# Patient Record
Sex: MALE | Race: WHITE | NOT HISPANIC OR LATINO | Employment: FULL TIME | URBAN - NONMETROPOLITAN AREA
[De-identification: names, ages, dates, MRNs, and addresses within clinical notes are randomized per-mention and may not be internally consistent; named-entity substitution may affect disease eponyms.]

---

## 2017-02-13 ENCOUNTER — CLINICAL SUPPORT (OUTPATIENT)
Dept: CARDIOLOGY | Facility: CLINIC | Age: 57
End: 2017-02-13

## 2017-02-13 DIAGNOSIS — R55 SYNCOPE, UNSPECIFIED SYNCOPE TYPE: ICD-10-CM

## 2017-02-13 DIAGNOSIS — Z95.818 STATUS POST PLACEMENT OF IMPLANTABLE LOOP RECORDER: Primary | ICD-10-CM

## 2017-02-13 PROCEDURE — 93299 PR REM INTERROG ICPMS/SCRMS <30 D TECH REVIEW: CPT | Performed by: INTERNAL MEDICINE

## 2017-02-13 PROCEDURE — 93298 REM INTERROG DEV EVAL SCRMS: CPT | Performed by: INTERNAL MEDICINE

## 2017-02-13 NOTE — PROGRESS NOTES
Taviaq Report- [facility]    Primary Cardiologist:  Marce  Reason for implant:  syncope  Battery:  ok  Events:  No new events  Changes:  n/a    Follow up:  3 months

## 2017-02-18 ENCOUNTER — HOSPITAL ENCOUNTER (EMERGENCY)
Facility: HOSPITAL | Age: 57
Discharge: HOME | End: 2017-02-19
Attending: EMERGENCY MEDICINE | Admitting: EMERGENCY MEDICINE

## 2017-02-18 ENCOUNTER — APPOINTMENT (OUTPATIENT)
Dept: CT IMAGING | Facility: HOSPITAL | Age: 57
End: 2017-02-18

## 2017-02-18 ENCOUNTER — APPOINTMENT (OUTPATIENT)
Dept: GENERAL RADIOLOGY | Facility: HOSPITAL | Age: 57
End: 2017-02-18

## 2017-02-18 DIAGNOSIS — R55 SYNCOPE AND COLLAPSE: Primary | ICD-10-CM

## 2017-02-18 DIAGNOSIS — R79.89 POSITIVE D DIMER: ICD-10-CM

## 2017-02-18 LAB
ALBUMIN SERPL-MCNC: 4.2 G/DL (ref 3.5–5)
ALBUMIN/GLOB SERPL: 1.4 G/DL (ref 1.1–2.5)
ALP SERPL-CCNC: 89 U/L (ref 24–120)
ALT SERPL W P-5'-P-CCNC: 57 U/L (ref 0–54)
AMPHET+METHAMPHET UR QL: NEGATIVE
ANION GAP SERPL CALCULATED.3IONS-SCNC: 11 MMOL/L (ref 4–13)
ARTERIAL PATENCY WRIST A: NORMAL
AST SERPL-CCNC: 47 U/L (ref 7–45)
ATMOSPHERIC PRESS: NORMAL MMHG
BARBITURATES UR QL SCN: NEGATIVE
BASE EXCESS BLDA CALC-SCNC: -1.9 MMOL/L (ref -2–2)
BASOPHILS # BLD AUTO: 0.02 10*3/MM3 (ref 0–0.2)
BASOPHILS NFR BLD AUTO: 0.3 % (ref 0–2)
BDY SITE: NORMAL
BENZODIAZ UR QL SCN: NEGATIVE
BILIRUB SERPL-MCNC: 0.4 MG/DL (ref 0.1–1)
BUN BLD-MCNC: 14 MG/DL (ref 5–21)
BUN/CREAT SERPL: 13.6 (ref 7–25)
CALCIUM SPEC-SCNC: 8.6 MG/DL (ref 8.4–10.4)
CANNABINOIDS SERPL QL: NEGATIVE
CHLORIDE SERPL-SCNC: 104 MMOL/L (ref 98–110)
CO2 SERPL-SCNC: 25 MMOL/L (ref 24–31)
COCAINE UR QL: NEGATIVE
CREAT BLD-MCNC: 1.03 MG/DL (ref 0.5–1.4)
D DIMER PPP FEU-MCNC: 3.12 MG/L (FEU) (ref 0–0.5)
DEPRECATED RDW RBC AUTO: 42.6 FL (ref 40–54)
EOSINOPHIL # BLD AUTO: 0.02 10*3/MM3 (ref 0–0.7)
EOSINOPHIL NFR BLD AUTO: 0.3 % (ref 0–4)
ERYTHROCYTE [DISTWIDTH] IN BLOOD BY AUTOMATED COUNT: 13.8 % (ref 12–15)
ETHANOL UR QL: <0.01 %
GAS FLOW AIRWAY: 2 LPM
GFR SERPL CREATININE-BSD FRML MDRD: 75 ML/MIN/1.73
GLOBULIN UR ELPH-MCNC: 2.9 GM/DL
GLUCOSE BLD-MCNC: 110 MG/DL (ref 70–100)
HCO3 BLDA-SCNC: 23.1 MMOL/L (ref 22–26)
HCT VFR BLD AUTO: 43.2 % (ref 40–52)
HGB BLD-MCNC: 14.8 G/DL (ref 14–18)
IMM GRANULOCYTES # BLD: 0.03 10*3/MM3 (ref 0–0.03)
IMM GRANULOCYTES NFR BLD: 0.4 % (ref 0–5)
LYMPHOCYTES # BLD AUTO: 1.13 10*3/MM3 (ref 0.72–4.86)
LYMPHOCYTES NFR BLD AUTO: 15.8 % (ref 15–45)
MCH RBC QN AUTO: 29.1 PG (ref 28–32)
MCHC RBC AUTO-ENTMCNC: 34.3 G/DL (ref 33–36)
MCV RBC AUTO: 84.9 FL (ref 82–95)
METHADONE UR QL SCN: NEGATIVE
MODALITY: NORMAL
MONOCYTES # BLD AUTO: 0.67 10*3/MM3 (ref 0.19–1.3)
MONOCYTES NFR BLD AUTO: 9.4 % (ref 4–12)
NEUTROPHILS # BLD AUTO: 5.27 10*3/MM3 (ref 1.87–8.4)
NEUTROPHILS NFR BLD AUTO: 73.8 % (ref 39–78)
OPIATES UR QL: NEGATIVE
PCO2 BLDA: 40.2 MM HG (ref 35–45)
PCP UR QL SCN: NEGATIVE
PH BLDA: 7.38 PH UNITS (ref 7.35–7.45)
PLATELET # BLD AUTO: 204 10*3/MM3 (ref 130–400)
PMV BLD AUTO: 10.7 FL (ref 6–12)
PO2 BLDA: 89.1 MM HG (ref 80–100)
POTASSIUM BLD-SCNC: 4.2 MMOL/L (ref 3.5–5.3)
PROT SERPL-MCNC: 7.1 G/DL (ref 6.3–8.7)
RBC # BLD AUTO: 5.09 10*6/MM3 (ref 4.8–5.9)
SAO2 % BLDCOA: 96.7 % (ref 94–100)
SAO2 % BLDCOA: 96.7 % (ref 94–100)
SODIUM BLD-SCNC: 140 MMOL/L (ref 135–145)
TROPONIN I SERPL-MCNC: 0 NG/ML (ref 0–0.07)
WBC NRBC COR # BLD: 7.14 10*3/MM3 (ref 4.8–10.8)

## 2017-02-18 PROCEDURE — 80307 DRUG TEST PRSMV CHEM ANLYZR: CPT | Performed by: EMERGENCY MEDICINE

## 2017-02-18 PROCEDURE — 85025 COMPLETE CBC W/AUTO DIFF WBC: CPT | Performed by: EMERGENCY MEDICINE

## 2017-02-18 PROCEDURE — 99284 EMERGENCY DEPT VISIT MOD MDM: CPT

## 2017-02-18 PROCEDURE — 70450 CT HEAD/BRAIN W/O DYE: CPT

## 2017-02-18 PROCEDURE — 84484 ASSAY OF TROPONIN QUANT: CPT

## 2017-02-18 PROCEDURE — 93005 ELECTROCARDIOGRAM TRACING: CPT | Performed by: EMERGENCY MEDICINE

## 2017-02-18 PROCEDURE — 93010 ELECTROCARDIOGRAM REPORT: CPT | Performed by: INTERNAL MEDICINE

## 2017-02-18 PROCEDURE — 85379 FIBRIN DEGRADATION QUANT: CPT | Performed by: EMERGENCY MEDICINE

## 2017-02-18 PROCEDURE — 71010 HC CHEST PA OR AP: CPT

## 2017-02-18 PROCEDURE — 36600 WITHDRAWAL OF ARTERIAL BLOOD: CPT

## 2017-02-18 PROCEDURE — 80053 COMPREHEN METABOLIC PANEL: CPT | Performed by: EMERGENCY MEDICINE

## 2017-02-18 PROCEDURE — 82803 BLOOD GASES ANY COMBINATION: CPT

## 2017-02-18 RX ORDER — SODIUM CHLORIDE 0.9 % (FLUSH) 0.9 %
10 SYRINGE (ML) INJECTION AS NEEDED
Status: DISCONTINUED | OUTPATIENT
Start: 2017-02-18 | End: 2017-02-19 | Stop reason: HOSPADM

## 2017-02-19 VITALS
OXYGEN SATURATION: 97 % | BODY MASS INDEX: 23.1 KG/M2 | HEIGHT: 71 IN | WEIGHT: 165 LBS | HEART RATE: 87 BPM | RESPIRATION RATE: 16 BRPM | SYSTOLIC BLOOD PRESSURE: 135 MMHG | TEMPERATURE: 98.3 F | DIASTOLIC BLOOD PRESSURE: 84 MMHG

## 2017-02-19 NOTE — ED PROVIDER NOTES
Subjective   HPI Comments: Very vague historian     Patient is a 56 y.o. male presenting with syncope.   Syncope   Episode history:  Single  Most recent episode:  Today  Progression:  Resolved  Chronicity:  New  Context: standing up    Context: not blood draw, not bowel movement, not dehydration, not exertion, not inactivity, not medication change, not with normal activity, not sight of blood, not sitting down and not urination    Witnessed: yes    Relieved by:  Nothing  Worsened by:  Nothing  Ineffective treatments:  None tried  Associated symptoms: no anxiety, no chest pain, no confusion, no diaphoresis, no difficulty breathing, no dizziness, no fever, no focal weakness, no headaches, no malaise/fatigue, no nausea, no palpitations, no recent injury, no recent surgery, no rectal bleeding, no seizures, no shortness of breath, no visual change, no vomiting and no weakness    Risk factors: no congenital heart disease and no seizures        Review of Systems   Constitutional: Negative.  Negative for activity change, appetite change, chills, diaphoresis, fatigue, fever, malaise/fatigue and unexpected weight change.   HENT: Negative.  Negative for congestion, drooling, ear pain, facial swelling and trouble swallowing.    Eyes: Negative.  Negative for photophobia, discharge and itching.   Respiratory: Negative.  Negative for apnea, cough, choking, chest tightness, shortness of breath and stridor.    Cardiovascular: Positive for syncope. Negative for chest pain, palpitations and leg swelling.   Gastrointestinal: Negative.  Negative for abdominal distention, abdominal pain, diarrhea, nausea and vomiting.   Endocrine: Negative.  Negative for cold intolerance, heat intolerance, polydipsia and polyphagia.   Genitourinary: Negative.  Negative for decreased urine volume, frequency and hematuria.   Musculoskeletal: Negative.  Negative for arthralgias, back pain and neck pain.   Skin: Negative.  Negative for color change, pallor  and rash.   Allergic/Immunologic: Negative.    Neurological: Negative for dizziness, focal weakness, seizures, syncope, speech difficulty, weakness, light-headedness and headaches.   Hematological: Negative.    Psychiatric/Behavioral: Negative for confusion.   All other systems reviewed and are negative.      Past Medical History   Diagnosis Date   • Aortic valve stenosis    • Hyperlipidemia    • Hypertension    • Palpitations    • Syncope    • Tobacco use        No Known Allergies    Past Surgical History   Procedure Laterality Date   • Coronary artery bypass graft     • Cardiac valve replacement     • Atrial appendage exclusion left with transesophageal echocardiogram     • Aortic valve repair/replacement         Family History   Problem Relation Age of Onset   • Hypertension Mother    • Diabetes Mother    • Heart valve disorder Father    • Hypertension Father    • Heart disease Father        Social History     Social History   • Marital status:      Spouse name: N/A   • Number of children: N/A   • Years of education: N/A     Social History Main Topics   • Smoking status: Former Smoker   • Smokeless tobacco: Never Used   • Alcohol use 9.0 oz/week     15 Cans of beer per week   • Drug use: No   • Sexual activity: Not Asked     Other Topics Concern   • None     Social History Narrative   • None           Objective   Physical Exam   Constitutional: He is oriented to person, place, and time. He appears well-developed and well-nourished.  Non-toxic appearance. No distress.   HENT:   Head: Normocephalic and atraumatic.   Nose: Nose normal.   Mouth/Throat: Uvula is midline, oropharynx is clear and moist and mucous membranes are normal.   Eyes: Conjunctivae, EOM and lids are normal. Pupils are equal, round, and reactive to light. Lids are everted and swept, no foreign bodies found.   Neck: Trachea normal, full passive range of motion without pain and phonation normal. Neck supple. Normal carotid pulses and no JVD  present. Carotid bruit is not present. No rigidity.   Cardiovascular: Normal rate, regular rhythm, normal heart sounds, intact distal pulses and normal pulses.    Pulmonary/Chest: Effort normal and breath sounds normal. No stridor. No apnea and no tachypnea. No respiratory distress.   Abdominal: Soft. Normal appearance, normal aorta and bowel sounds are normal. There is no hepatosplenomegaly. No hernia.       Vascular Status -  His exam exhibits right foot vasculature normal. His exam exhibits no right foot edema. His exam exhibits left foot vasculature normal. His exam exhibits no left foot edema.  Neurological: He is alert and oriented to person, place, and time. He has normal strength and normal reflexes. He displays normal reflexes. No cranial nerve deficit or sensory deficit. Gait normal. GCS eye subscore is 4. GCS verbal subscore is 5. GCS motor subscore is 6.   Skin: Skin is warm, dry and intact. He is not diaphoretic. No pallor.   Psychiatric: He has a normal mood and affect. His speech is normal and behavior is normal.   Nursing note and vitals reviewed.      Procedures         ED Course  ED Course   Comment By Time   Sinus rhythm with nonspecific ST-T wave changes and is as needed as he felt he that he sometimes feels he has regarding no nocturia GW.  So I will give her another one this is where he has none McGray O he is doing that and the to go upstairs to the bathroom with the he water, overlying the floor and he will Irving Martinez MD 02/18 2156   CT is negative by the v rad Irving Martinez MD 02/18 7253   I have discussed this case at length with the patient and he had a syncopal episode at workstrongly encouraged him to be admitted to the hospital for further workupinitiated get MRIs and CT of the chest since his d-dimer is elevatedhe states that he has all these tests done in the past he has that MRIs he is got a loop recorderhe has had 3 separate similar episode in the past very comprehensive workup  with negative findingshe does not want to stay in the hospital for further workup to his prior charts and he did have elevated d-dimer in the past which was followed up by a negative CT of the chest he has had MRIs in the past Irving Martinez MD 02/19 0057   I still think the patient is to be admitted since he had a syncopal episode but after discussion decided go home will be discharged home with close follow-up with her primary M.D. Irving Martinez MD 02/19 0058                  [facility]    Final diagnoses:   Syncope and collapse   Positive D dimer            Irving Martinez MD  02/19/17 0100

## 2017-02-19 NOTE — ED NOTES
Pt states no S/S before syncopal episode occurred. Pt states he does not remember what happened, pt remembers waking up in the ambulance.  Pt is A&O x 4.     Bernice Stockton RN  02/18/17 0655

## 2017-04-04 ENCOUNTER — CLINICAL SUPPORT (OUTPATIENT)
Dept: CARDIOLOGY | Facility: CLINIC | Age: 57
End: 2017-04-04

## 2017-04-04 ENCOUNTER — OFFICE VISIT (OUTPATIENT)
Dept: CARDIOLOGY | Facility: CLINIC | Age: 57
End: 2017-04-04

## 2017-04-04 VITALS
BODY MASS INDEX: 25.48 KG/M2 | HEART RATE: 74 BPM | WEIGHT: 182 LBS | HEIGHT: 71 IN | SYSTOLIC BLOOD PRESSURE: 142 MMHG | DIASTOLIC BLOOD PRESSURE: 86 MMHG

## 2017-04-04 DIAGNOSIS — R55 SYNCOPE, UNSPECIFIED SYNCOPE TYPE: ICD-10-CM

## 2017-04-04 DIAGNOSIS — R55 SYNCOPE, UNSPECIFIED SYNCOPE TYPE: Primary | ICD-10-CM

## 2017-04-04 DIAGNOSIS — Z95.818 STATUS POST PLACEMENT OF IMPLANTABLE LOOP RECORDER: Primary | ICD-10-CM

## 2017-04-04 DIAGNOSIS — Z95.2 S/P AVR (AORTIC VALVE REPLACEMENT): ICD-10-CM

## 2017-04-04 DIAGNOSIS — I10 ESSENTIAL HYPERTENSION: ICD-10-CM

## 2017-04-04 PROCEDURE — 93000 ELECTROCARDIOGRAM COMPLETE: CPT | Performed by: NURSE PRACTITIONER

## 2017-04-04 PROCEDURE — 93285 PRGRMG DEV EVAL SCRMS IP: CPT | Performed by: INTERNAL MEDICINE

## 2017-04-04 PROCEDURE — 99214 OFFICE O/P EST MOD 30 MIN: CPT | Performed by: NURSE PRACTITIONER

## 2017-04-04 NOTE — PROGRESS NOTES
"    Subjective:     Encounter Date:04/04/2017      Patient ID: Jose Juan Ann is a 56 y.o. male.    Chief Complaint:  HPI Comments: Pt reports he continues to have syncope. These episodes have been intermittent since 2015. His aortic valve was replaced 7/2016. He has continued to have syncopal episodes - again in Feb. And last week. He was evaluated in the ER in Feb.- he had an elevated d-dimer at that time but refused a CTA chest.  He states this happens when he is sitting back and his legs are elevated. He reports his co workers report he lets out a moan and he is out for approximately 30 seconds and he \"turns blue\" and stiffens, but does not convulse. He states he has had a complete workup by Dr. Rodriguez as well.       The following portions of the patient's history were reviewed and updated as appropriate: allergies, current medications, past family history, past medical history, past social history, past surgical history and problem list.  /86  Pulse 74  Ht 71\" (180.3 cm)  Wt 182 lb (82.6 kg)  BMI 25.38 kg/m2  No Known Allergies    Current Outpatient Prescriptions:   •  aspirin 81 MG EC tablet, Take 1 tablet by mouth Daily., Disp: 30 tablet, Rfl: 5  •  Krill Oil 300 MG capsule, Take 300 mg by mouth Daily., Disp: , Rfl:   •  lisinopril (PRINIVIL,ZESTRIL) 2.5 MG tablet, Take 1 tablet by mouth Daily., Disp: 30 tablet, Rfl: 5  •  metoprolol tartrate (LOPRESSOR) 25 MG tablet, Take 1/2 tablet (12.5mg) PO BID, Disp: 30 tablet, Rfl: 5  •  Multiple Vitamins-Minerals (MULTIVITAMIN ADULT PO), Take  by mouth., Disp: , Rfl:   Past Medical History:   Diagnosis Date   • Aortic valve stenosis    • History of loop recorder    • Hyperlipidemia    • Hypertension    • Palpitations    • Syncope    • Tobacco use      Past Surgical History:   Procedure Laterality Date   • AORTIC VALVE REPAIR/REPLACEMENT     • ATRIAL APPENDAGE EXCLUSION LEFT WITH TRANSESOPHAGEAL ECHOCARDIOGRAM     • CARDIAC VALVE REPLACEMENT     • CORONARY " ARTERY BYPASS GRAFT       Social History     Social History   • Marital status:      Spouse name: N/A   • Number of children: N/A   • Years of education: N/A     Occupational History   • Not on file.     Social History Main Topics   • Smoking status: Former Smoker   • Smokeless tobacco: Never Used   • Alcohol use 9.0 oz/week     15 Cans of beer per week      Comment: 3-5 daily   • Drug use: No   • Sexual activity: Defer     Other Topics Concern   • Not on file     Social History Narrative     Family History   Problem Relation Age of Onset   • Hypertension Mother    • Diabetes Mother    • Heart valve disorder Father    • Hypertension Father    • Heart disease Father        Review of Systems   Constitution: Negative for chills, diaphoresis, fever and weakness.   HENT: Negative for nosebleeds.    Eyes: Negative for visual disturbance.   Cardiovascular: Positive for syncope. Negative for chest pain, claudication, cyanosis, dyspnea on exertion, irregular heartbeat, leg swelling, near-syncope, orthopnea, palpitations and paroxysmal nocturnal dyspnea.   Respiratory: Negative for cough, hemoptysis, shortness of breath, sputum production and wheezing.    Hematologic/Lymphatic: Negative for bleeding problem.   Skin: Negative for color change and flushing.   Musculoskeletal: Negative for falls.   Gastrointestinal: Negative for bloating, abdominal pain, hematemesis, hematochezia, melena, nausea and vomiting.   Genitourinary: Negative for hematuria.   Neurological: Negative for dizziness and light-headedness.   Psychiatric/Behavioral: Negative for altered mental status.         ECG 12 Lead  Date/Time: 4/4/2017 4:27 PM  Performed by: HARPAL CORONADO  Authorized by: HARPAL CORONADO   Comparison: compared with previous ECG from 2/18/2017  Similar to previous ECG  Rhythm: sinus rhythm               Objective:     Physical Exam   Constitutional: He is oriented to person, place, and time. He appears well-developed and well-nourished.  No distress.   HENT:   Head: Normocephalic and atraumatic.   Eyes: Pupils are equal, round, and reactive to light.   Neck: Normal range of motion. Neck supple. No JVD present. No thyromegaly present.   Cardiovascular: Normal rate, regular rhythm, normal heart sounds and intact distal pulses.  Exam reveals no gallop and no friction rub.    No murmur heard.  Pulses:       Dorsalis pedis pulses are 2+ on the right side, and 2+ on the left side.   Pulmonary/Chest: Effort normal and breath sounds normal. No respiratory distress. He has no wheezes. He has no rales. He exhibits no tenderness.   Abdominal: Soft. Bowel sounds are normal. He exhibits no distension. There is no tenderness.   Musculoskeletal: Normal range of motion. He exhibits no edema.   Neurological: He is alert and oriented to person, place, and time. No cranial nerve deficit.   Skin: Skin is warm and dry. He is not diaphoretic.   Psychiatric: He has a normal mood and affect. His behavior is normal.       Lab Review:       Assessment:          Diagnosis Plan   1. Syncope, unspecified syncope type  Adult Transthoracic Echo Complete With Contrast   2. S/P AVR (aortic valve replacement)  Bioprosthetic, and ALEJANDRA exclusion 7/2016 per Dr. Aponte   3. Essential hypertension  Borderline elevated today, pt reports low pressures at times at home, as well as high. Call here or PCP with persistently high or low pressures      Loop recorder interrogated by Myra Ochoa RN. Reviewed results with her, no significant arrhythmia.        Plan:       Follow up 1 month, sooner with new or worsening symptoms.

## 2017-04-05 ENCOUNTER — TELEPHONE (OUTPATIENT)
Dept: CARDIOLOGY | Facility: CLINIC | Age: 57
End: 2017-04-05

## 2017-04-05 DIAGNOSIS — R55 SYNCOPE, UNSPECIFIED SYNCOPE TYPE: Primary | ICD-10-CM

## 2017-04-05 NOTE — TELEPHONE ENCOUNTER
----- Message from TRAV Mcgovern sent at 4/4/2017  4:40 PM CDT -----  Please notify pt, due to his recent D-dimer elevation in the ER, I recommend CTA of the chest (because of his recent syncope as well). Dr. Martinez's note states he refused a CTA of the chest in the ER that day. Would he be willing to reconsider this to rule out PE or other abnormality ?

## 2017-04-05 NOTE — PROGRESS NOTES
Jay Report- in office    April 4, 2017     Primary Cardiologist:  Marce  Reason for implant:  syncope  Battery:  good  Events:  1 symptom recorded- appears to be normal sinus  bpm  Changes:  Changed blank after sense to 130ms, changed sensing threshold decay delay to 130ms both due to seeing some undersensing of PVCs in previous episodes.    Follow up:  3 months or sooner if needed.

## 2017-04-05 NOTE — TELEPHONE ENCOUNTER
Pt has been notified. He finally said he would have a CTA done but he was not wanting to because he said in the last couple of years his D dimer has been elevated and he has them done and it is always normal.Before getting off the phone he said he would do it.

## 2017-04-11 ENCOUNTER — HOSPITAL ENCOUNTER (OUTPATIENT)
Dept: CARDIOLOGY | Facility: HOSPITAL | Age: 57
Discharge: HOME | End: 2017-04-11
Admitting: NURSE PRACTITIONER

## 2017-04-11 ENCOUNTER — HOSPITAL ENCOUNTER (OUTPATIENT)
Dept: CT IMAGING | Facility: HOSPITAL | Age: 57
Discharge: HOME | End: 2017-04-11

## 2017-04-11 VITALS
SYSTOLIC BLOOD PRESSURE: 130 MMHG | DIASTOLIC BLOOD PRESSURE: 85 MMHG | HEIGHT: 71 IN | BODY MASS INDEX: 25.2 KG/M2 | WEIGHT: 180 LBS

## 2017-04-11 DIAGNOSIS — R55 SYNCOPE, UNSPECIFIED SYNCOPE TYPE: ICD-10-CM

## 2017-04-11 LAB
BH CV ECHO MEAS - AO MAX PG (FULL): 13.8 MMHG
BH CV ECHO MEAS - AO MAX PG: 17.8 MMHG
BH CV ECHO MEAS - AO MEAN PG (FULL): 6 MMHG
BH CV ECHO MEAS - AO MEAN PG: 8 MMHG
BH CV ECHO MEAS - AO ROOT AREA: 11.3 CM^2
BH CV ECHO MEAS - AO ROOT DIAM: 3.8 CM
BH CV ECHO MEAS - AO V2 MAX: 211 CM/SEC
BH CV ECHO MEAS - AO V2 MEAN: 131 CM/SEC
BH CV ECHO MEAS - AO V2 VTI: 46 CM
BH CV ECHO MEAS - AVA(I,A): 1.5 CM^2
BH CV ECHO MEAS - AVA(I,D): 1.5 CM^2
BH CV ECHO MEAS - AVA(V,A): 1.5 CM^2
BH CV ECHO MEAS - AVA(V,D): 1.5 CM^2
BH CV ECHO MEAS - CONTRAST EF 4CH: 66.2 ML/M^2
BH CV ECHO MEAS - EDV(CUBED): 100.5 ML
BH CV ECHO MEAS - EDV(MOD-SP4): 71.7 ML
BH CV ECHO MEAS - EDV(TEICH): 99.8 ML
BH CV ECHO MEAS - EF(CUBED): 68.9 %
BH CV ECHO MEAS - EF(MOD-SP4): 66.2 %
BH CV ECHO MEAS - EF(TEICH): 60.5 %
BH CV ECHO MEAS - ESV(CUBED): 31.3 ML
BH CV ECHO MEAS - ESV(MOD-SP4): 24.2 ML
BH CV ECHO MEAS - ESV(TEICH): 39.4 ML
BH CV ECHO MEAS - FS: 32.3 %
BH CV ECHO MEAS - IVS/LVPW: 0.93
BH CV ECHO MEAS - IVSD: 0.86 CM
BH CV ECHO MEAS - LA DIMENSION: 3.9 CM
BH CV ECHO MEAS - LA/AO: 1
BH CV ECHO MEAS - LAT PEAK E' VEL: 7.4 CM/SEC
BH CV ECHO MEAS - LV MASS(C)D: 137.5 GRAMS
BH CV ECHO MEAS - LV MAX PG: 4 MMHG
BH CV ECHO MEAS - LV MEAN PG: 2 MMHG
BH CV ECHO MEAS - LV V1 MAX: 100 CM/SEC
BH CV ECHO MEAS - LV V1 MEAN: 54.9 CM/SEC
BH CV ECHO MEAS - LV V1 VTI: 22.2 CM
BH CV ECHO MEAS - LVIDD: 4.7 CM
BH CV ECHO MEAS - LVIDS: 3.2 CM
BH CV ECHO MEAS - LVLD AP4: 8.4 CM
BH CV ECHO MEAS - LVLS AP4: 6.7 CM
BH CV ECHO MEAS - LVOT AREA (M): 3.1 CM^2
BH CV ECHO MEAS - LVOT AREA: 3.1 CM^2
BH CV ECHO MEAS - LVOT DIAM: 2 CM
BH CV ECHO MEAS - LVPWD: 0.92 CM
BH CV ECHO MEAS - MV A MAX VEL: 62.1 CM/SEC
BH CV ECHO MEAS - MV DEC TIME: 0.19 SEC
BH CV ECHO MEAS - MV E MAX VEL: 70.3 CM/SEC
BH CV ECHO MEAS - MV E/A: 1.1
BH CV ECHO MEAS - SV(AO): 521.7 ML
BH CV ECHO MEAS - SV(CUBED): 69.3 ML
BH CV ECHO MEAS - SV(LVOT): 69.7 ML
BH CV ECHO MEAS - SV(MOD-SP4): 47.5 ML
BH CV ECHO MEAS - SV(TEICH): 60.4 ML
CREAT BLDA-MCNC: 1.1 MG/DL (ref 0.6–1.3)
E/E' RATIO: 10.4
LEFT ATRIUM VOLUME: 60 CM3

## 2017-04-11 PROCEDURE — 93306 TTE W/DOPPLER COMPLETE: CPT | Performed by: INTERNAL MEDICINE

## 2017-04-11 PROCEDURE — 93306 TTE W/DOPPLER COMPLETE: CPT

## 2017-04-11 PROCEDURE — 82565 ASSAY OF CREATININE: CPT

## 2017-04-11 PROCEDURE — 71275 CT ANGIOGRAPHY CHEST: CPT

## 2017-04-11 PROCEDURE — 0 IOPAMIDOL PER 1 ML: Performed by: NURSE PRACTITIONER

## 2017-04-11 RX ADMIN — IOPAMIDOL 100 ML: 755 INJECTION, SOLUTION INTRAVENOUS at 10:30

## 2017-04-12 ENCOUNTER — TELEPHONE (OUTPATIENT)
Dept: CARDIOLOGY | Facility: CLINIC | Age: 57
End: 2017-04-12

## 2017-04-12 NOTE — TELEPHONE ENCOUNTER
----- Message from TRAV Mcgovern sent at 4/11/2017  2:09 PM CDT -----  Please notify pt echo is WNL- normal pumping function and normal function of the aortic valve. Thanks.   ----- Message -----     From: Mani Zheng MD     Sent: 4/11/2017   1:47 PM       To: TRAV Mcgovern

## 2017-04-12 NOTE — TELEPHONE ENCOUNTER
----- Message from TRAV Mcgovern sent at 4/11/2017 10:29 AM CDT -----  Please notify pt CTA looks okay; no acute process or findings. Thanks.   ----- Message -----     From: Interface, Rad Results [place] In     Sent: 4/11/2017  10:24 AM       To: TRAV Mcgovern

## 2017-05-22 ENCOUNTER — TELEPHONE (OUTPATIENT)
Dept: CARDIOLOGY | Facility: CLINIC | Age: 57
End: 2017-05-22

## 2017-07-06 ENCOUNTER — CLINICAL SUPPORT (OUTPATIENT)
Dept: CARDIOLOGY | Facility: CLINIC | Age: 57
End: 2017-07-06

## 2017-07-06 DIAGNOSIS — Z95.818 STATUS POST PLACEMENT OF IMPLANTABLE LOOP RECORDER: Primary | ICD-10-CM

## 2017-07-06 DIAGNOSIS — R55 SYNCOPE, UNSPECIFIED SYNCOPE TYPE: ICD-10-CM

## 2017-07-06 PROCEDURE — 93299 PR REM INTERROG ICPMS/SCRMS <30 D TECH REVIEW: CPT | Performed by: INTERNAL MEDICINE

## 2017-07-06 PROCEDURE — 93298 REM INTERROG DEV EVAL SCRMS: CPT | Performed by: INTERNAL MEDICINE

## 2017-07-06 NOTE — PROGRESS NOTES
[facility]q Report- [facility]    July 6, 2017    Primary Cardiologist:  Marce  Reason for implant:  syncope  Battery:  OK  Events:  No new events  Changes:  n/a    Follow up:  3 months

## 2017-10-06 ENCOUNTER — CLINICAL SUPPORT (OUTPATIENT)
Dept: CARDIOLOGY | Facility: CLINIC | Age: 57
End: 2017-10-06

## 2017-10-06 DIAGNOSIS — R55 SYNCOPE, UNSPECIFIED SYNCOPE TYPE: ICD-10-CM

## 2017-10-06 PROCEDURE — 93298 REM INTERROG DEV EVAL SCRMS: CPT | Performed by: PHYSICIAN ASSISTANT

## 2017-10-06 PROCEDURE — 93299 PR REM INTERROG ICPMS/SCRMS <30 D TECH REVIEW: CPT | Performed by: PHYSICIAN ASSISTANT

## 2017-10-11 NOTE — PROGRESS NOTES
Linq Report- [facility]    October 10, 2017    Primary Cardiologist:  Marce  Reason for implant:  syncope  Battery:  ok  Events:  No new events  Changes:  n/a    Follow up:  3 months

## 2019-04-05 ENCOUNTER — HOSPITAL ENCOUNTER (OUTPATIENT)
Facility: HOSPITAL | Age: 59
Discharge: HOME | End: 2019-04-05
Attending: EMERGENCY MEDICINE | Admitting: EMERGENCY MEDICINE

## 2019-04-05 ENCOUNTER — APPOINTMENT (OUTPATIENT)
Dept: NEUROLOGY | Facility: HOSPITAL | Age: 59
End: 2019-04-05

## 2019-04-05 ENCOUNTER — APPOINTMENT (OUTPATIENT)
Dept: GENERAL RADIOLOGY | Facility: HOSPITAL | Age: 59
End: 2019-04-05

## 2019-04-05 ENCOUNTER — ANESTHESIA (OUTPATIENT)
Dept: PERIOP | Facility: HOSPITAL | Age: 59
End: 2019-04-05

## 2019-04-05 ENCOUNTER — APPOINTMENT (OUTPATIENT)
Dept: CARDIOLOGY | Facility: HOSPITAL | Age: 59
End: 2019-04-05

## 2019-04-05 ENCOUNTER — ANESTHESIA EVENT (OUTPATIENT)
Dept: PERIOP | Facility: HOSPITAL | Age: 59
End: 2019-04-05

## 2019-04-05 VITALS
HEART RATE: 88 BPM | BODY MASS INDEX: 25.49 KG/M2 | HEIGHT: 71 IN | RESPIRATION RATE: 16 BRPM | DIASTOLIC BLOOD PRESSURE: 89 MMHG | TEMPERATURE: 97.9 F | WEIGHT: 182.1 LBS | SYSTOLIC BLOOD PRESSURE: 134 MMHG | OXYGEN SATURATION: 99 %

## 2019-04-05 DIAGNOSIS — R55 SYNCOPE, UNSPECIFIED SYNCOPE TYPE: ICD-10-CM

## 2019-04-05 DIAGNOSIS — S01.81XA FACIAL LACERATION, INITIAL ENCOUNTER: Primary | ICD-10-CM

## 2019-04-05 DIAGNOSIS — S02.92XB OPEN FRACTURE OF FACIAL BONE, UNSPECIFIED FACIAL BONE, INITIAL ENCOUNTER (HCC): ICD-10-CM

## 2019-04-05 DIAGNOSIS — S02.40DB OPEN FRACTURE OF LEFT SIDE OF MAXILLA, INITIAL ENCOUNTER (HCC): ICD-10-CM

## 2019-04-05 LAB
ABO GROUP BLD: NORMAL
ANION GAP SERPL CALCULATED.3IONS-SCNC: 8 MMOL/L (ref 4–13)
APTT PPP: 31.5 SECONDS (ref 24.1–35)
BASOPHILS # BLD AUTO: 0.03 10*3/MM3 (ref 0–0.2)
BASOPHILS NFR BLD AUTO: 0.2 % (ref 0–2)
BH CV ECHO MEAS - AO MAX PG (FULL): 43.8 MMHG
BH CV ECHO MEAS - AO MAX PG: 49.3 MMHG
BH CV ECHO MEAS - AO MEAN PG (FULL): 25.5 MMHG
BH CV ECHO MEAS - AO MEAN PG: 28.5 MMHG
BH CV ECHO MEAS - AO ROOT AREA (BSA CORRECTED): 1.4
BH CV ECHO MEAS - AO ROOT AREA: 6.6 CM^2
BH CV ECHO MEAS - AO ROOT DIAM: 2.9 CM
BH CV ECHO MEAS - AO V2 MAX: 351 CM/SEC
BH CV ECHO MEAS - AO V2 MEAN: 246 CM/SEC
BH CV ECHO MEAS - AO V2 VTI: 79.8 CM
BH CV ECHO MEAS - AVA(I,A): 1.5 CM^2
BH CV ECHO MEAS - AVA(I,D): 1.5 CM^2
BH CV ECHO MEAS - AVA(V,A): 1.3 CM^2
BH CV ECHO MEAS - AVA(V,D): 1.3 CM^2
BH CV ECHO MEAS - BSA(HAYCOCK): 2 M^2
BH CV ECHO MEAS - BSA: 2 M^2
BH CV ECHO MEAS - BZI_BMI: 25.4 KILOGRAMS/M^2
BH CV ECHO MEAS - BZI_METRIC_HEIGHT: 180.3 CM
BH CV ECHO MEAS - BZI_METRIC_WEIGHT: 82.6 KG
BH CV ECHO MEAS - EDV(CUBED): 85.8 ML
BH CV ECHO MEAS - EDV(MOD-SP4): 67.2 ML
BH CV ECHO MEAS - EDV(TEICH): 88.2 ML
BH CV ECHO MEAS - EF(CUBED): 78.1 %
BH CV ECHO MEAS - EF(MOD-SP4): 62.5 %
BH CV ECHO MEAS - EF(TEICH): 70.5 %
BH CV ECHO MEAS - ESV(CUBED): 18.8 ML
BH CV ECHO MEAS - ESV(MOD-SP4): 25.2 ML
BH CV ECHO MEAS - ESV(TEICH): 26 ML
BH CV ECHO MEAS - FS: 39.7 %
BH CV ECHO MEAS - IVS/LVPW: 0.89
BH CV ECHO MEAS - IVSD: 1.2 CM
BH CV ECHO MEAS - LA DIMENSION: 4.3 CM
BH CV ECHO MEAS - LA/AO: 1.5
BH CV ECHO MEAS - LAT PEAK E' VEL: 13.3 CM/SEC
BH CV ECHO MEAS - LV DIASTOLIC VOL/BSA (35-75): 33.2 ML/M^2
BH CV ECHO MEAS - LV MASS(C)D: 199 GRAMS
BH CV ECHO MEAS - LV MASS(C)DI: 98.2 GRAMS/M^2
BH CV ECHO MEAS - LV MAX PG: 5.5 MMHG
BH CV ECHO MEAS - LV MEAN PG: 3 MMHG
BH CV ECHO MEAS - LV SYSTOLIC VOL/BSA (12-30): 12.4 ML/M^2
BH CV ECHO MEAS - LV V1 MAX: 117 CM/SEC
BH CV ECHO MEAS - LV V1 MEAN: 83.5 CM/SEC
BH CV ECHO MEAS - LV V1 VTI: 30.6 CM
BH CV ECHO MEAS - LVIDD: 4.4 CM
BH CV ECHO MEAS - LVIDS: 2.7 CM
BH CV ECHO MEAS - LVLD AP4: 8.5 CM
BH CV ECHO MEAS - LVLS AP4: 6.5 CM
BH CV ECHO MEAS - LVOT AREA (M): 3.8 CM^2
BH CV ECHO MEAS - LVOT AREA: 3.8 CM^2
BH CV ECHO MEAS - LVOT DIAM: 2.2 CM
BH CV ECHO MEAS - LVPWD: 1.3 CM
BH CV ECHO MEAS - MED PEAK E' VEL: 8.8 CM/SEC
BH CV ECHO MEAS - MR ALIAS VEL: 30.8 CM/SEC
BH CV ECHO MEAS - MR ERO: 0.12 CM^2
BH CV ECHO MEAS - MR FLOW RATE: 69.7 CM^3/SEC
BH CV ECHO MEAS - MR MAX PG: 136.4 MMHG
BH CV ECHO MEAS - MR MAX VEL: 584 CM/SEC
BH CV ECHO MEAS - MR MEAN PG: 103 MMHG
BH CV ECHO MEAS - MR MEAN VEL: 481 CM/SEC
BH CV ECHO MEAS - MR PISA RADIUS: 0.6 CM
BH CV ECHO MEAS - MR PISA: 2.3 CM^2
BH CV ECHO MEAS - MR VOLUME: 16.7 ML
BH CV ECHO MEAS - MR VTI: 140 CM
BH CV ECHO MEAS - MV A MAX VEL: 69.8 CM/SEC
BH CV ECHO MEAS - MV DEC TIME: 0.32 SEC
BH CV ECHO MEAS - MV E MAX VEL: 95.1 CM/SEC
BH CV ECHO MEAS - MV E/A: 1.4
BH CV ECHO MEAS - RAP SYSTOLE: 10 MMHG
BH CV ECHO MEAS - RVSP: 33.8 MMHG
BH CV ECHO MEAS - SI(AO): 260.2 ML/M^2
BH CV ECHO MEAS - SI(CUBED): 33 ML/M^2
BH CV ECHO MEAS - SI(LVOT): 57.4 ML/M^2
BH CV ECHO MEAS - SI(MOD-SP4): 20.7 ML/M^2
BH CV ECHO MEAS - SI(TEICH): 30.7 ML/M^2
BH CV ECHO MEAS - SV(AO): 527.1 ML
BH CV ECHO MEAS - SV(CUBED): 66.9 ML
BH CV ECHO MEAS - SV(LVOT): 116.3 ML
BH CV ECHO MEAS - SV(MOD-SP4): 42 ML
BH CV ECHO MEAS - SV(TEICH): 62.1 ML
BH CV ECHO MEAS - TR MAX VEL: 244 CM/SEC
BH CV ECHO MEASUREMENTS AVERAGE E/E' RATIO: 8.61
BLD GP AB SCN SERPL QL: NEGATIVE
BUN BLD-MCNC: 21 MG/DL (ref 5–21)
BUN/CREAT SERPL: 28.4 (ref 7–25)
CALCIUM SPEC-SCNC: 8.9 MG/DL (ref 8.4–10.4)
CHLORIDE SERPL-SCNC: 103 MMOL/L (ref 98–110)
CO2 SERPL-SCNC: 28 MMOL/L (ref 24–31)
CREAT BLD-MCNC: 0.74 MG/DL (ref 0.5–1.4)
DEPRECATED RDW RBC AUTO: 43.6 FL (ref 40–54)
EOSINOPHIL # BLD AUTO: 0 10*3/MM3 (ref 0–0.7)
EOSINOPHIL NFR BLD AUTO: 0 % (ref 0–4)
ERYTHROCYTE [DISTWIDTH] IN BLOOD BY AUTOMATED COUNT: 14.1 % (ref 12–15)
GFR SERPL CREATININE-BSD FRML MDRD: 109 ML/MIN/1.73
GLUCOSE BLD-MCNC: 96 MG/DL (ref 70–100)
HCT VFR BLD AUTO: 39.5 % (ref 40–52)
HGB BLD-MCNC: 13.4 G/DL (ref 14–18)
IMM GRANULOCYTES # BLD AUTO: 0.06 10*3/MM3 (ref 0–0.05)
IMM GRANULOCYTES NFR BLD AUTO: 0.4 % (ref 0–5)
INR PPP: 0.94 (ref 0.91–1.09)
LEFT ATRIUM VOLUME INDEX: 15.5 ML/M2
LEFT ATRIUM VOLUME: 31.5 CM3
LYMPHOCYTES # BLD AUTO: 0.91 10*3/MM3 (ref 0.72–4.86)
LYMPHOCYTES NFR BLD AUTO: 6.4 % (ref 15–45)
MAXIMAL PREDICTED HEART RATE: 162 BPM
MCH RBC QN AUTO: 28.9 PG (ref 28–32)
MCHC RBC AUTO-ENTMCNC: 33.9 G/DL (ref 33–36)
MCV RBC AUTO: 85.3 FL (ref 82–95)
MONOCYTES # BLD AUTO: 0.89 10*3/MM3 (ref 0.19–1.3)
MONOCYTES NFR BLD AUTO: 6.3 % (ref 4–12)
NEUTROPHILS # BLD AUTO: 12.32 10*3/MM3 (ref 1.87–8.4)
NEUTROPHILS NFR BLD AUTO: 86.7 % (ref 39–78)
NRBC BLD AUTO-RTO: 0 /100 WBC (ref 0–0)
PLATELET # BLD AUTO: 219 10*3/MM3 (ref 130–400)
PMV BLD AUTO: 10.4 FL (ref 6–12)
POTASSIUM BLD-SCNC: 4.3 MMOL/L (ref 3.5–5.3)
PROTHROMBIN TIME: 12.9 SECONDS (ref 11.9–14.6)
RBC # BLD AUTO: 4.63 10*6/MM3 (ref 4.8–5.9)
RH BLD: POSITIVE
SODIUM BLD-SCNC: 139 MMOL/L (ref 135–145)
STRESS TARGET HR: 138 BPM
T&S EXPIRATION DATE: NORMAL
WBC NRBC COR # BLD: 14.21 10*3/MM3 (ref 4.8–10.8)

## 2019-04-05 PROCEDURE — 90715 TDAP VACCINE 7 YRS/> IM: CPT | Performed by: EMERGENCY MEDICINE

## 2019-04-05 PROCEDURE — 93306 TTE W/DOPPLER COMPLETE: CPT | Performed by: INTERNAL MEDICINE

## 2019-04-05 PROCEDURE — 13152 CMPLX RPR E/N/E/L 2.6-7.5 CM: CPT | Performed by: OTOLARYNGOLOGY

## 2019-04-05 PROCEDURE — 31231 NASAL ENDOSCOPY DX: CPT | Performed by: OTOLARYNGOLOGY

## 2019-04-05 PROCEDURE — 25010000002 SUCCINYLCHOLINE PER 20 MG: Performed by: NURSE ANESTHETIST, CERTIFIED REGISTERED

## 2019-04-05 PROCEDURE — 86850 RBC ANTIBODY SCREEN: CPT | Performed by: EMERGENCY MEDICINE

## 2019-04-05 PROCEDURE — 93010 ELECTROCARDIOGRAM REPORT: CPT | Performed by: INTERNAL MEDICINE

## 2019-04-05 PROCEDURE — 90471 IMMUNIZATION ADMIN: CPT | Performed by: EMERGENCY MEDICINE

## 2019-04-05 PROCEDURE — 93306 TTE W/DOPPLER COMPLETE: CPT

## 2019-04-05 PROCEDURE — 99204 OFFICE O/P NEW MOD 45 MIN: CPT | Performed by: PSYCHIATRY & NEUROLOGY

## 2019-04-05 PROCEDURE — 25010000002 ONDANSETRON PER 1 MG: Performed by: NURSE ANESTHETIST, CERTIFIED REGISTERED

## 2019-04-05 PROCEDURE — 86901 BLOOD TYPING SEROLOGIC RH(D): CPT | Performed by: EMERGENCY MEDICINE

## 2019-04-05 PROCEDURE — 25010000002 DEXAMETHASONE PER 1 MG: Performed by: NURSE ANESTHETIST, CERTIFIED REGISTERED

## 2019-04-05 PROCEDURE — 13131 CMPLX RPR F/C/C/M/N/AX/G/H/F: CPT | Performed by: OTOLARYNGOLOGY

## 2019-04-05 PROCEDURE — 85025 COMPLETE CBC W/AUTO DIFF WBC: CPT | Performed by: EMERGENCY MEDICINE

## 2019-04-05 PROCEDURE — 25010000002 TDAP 5-2.5-18.5 LF-MCG/0.5 SUSPENSION: Performed by: EMERGENCY MEDICINE

## 2019-04-05 PROCEDURE — 85610 PROTHROMBIN TIME: CPT | Performed by: EMERGENCY MEDICINE

## 2019-04-05 PROCEDURE — 13153 CMPLX RPR E/N/E/L ADDL 5CM/<: CPT | Performed by: OTOLARYNGOLOGY

## 2019-04-05 PROCEDURE — 86900 BLOOD TYPING SEROLOGIC ABO: CPT | Performed by: EMERGENCY MEDICINE

## 2019-04-05 PROCEDURE — 25010000002 PROPOFOL 10 MG/ML EMULSION: Performed by: NURSE ANESTHETIST, CERTIFIED REGISTERED

## 2019-04-05 PROCEDURE — 93005 ELECTROCARDIOGRAM TRACING: CPT | Performed by: EMERGENCY MEDICINE

## 2019-04-05 PROCEDURE — 99214 OFFICE O/P EST MOD 30 MIN: CPT | Performed by: INTERNAL MEDICINE

## 2019-04-05 PROCEDURE — 99236 HOSP IP/OBS SAME DATE HI 85: CPT | Performed by: OTOLARYNGOLOGY

## 2019-04-05 PROCEDURE — 85730 THROMBOPLASTIN TIME PARTIAL: CPT | Performed by: EMERGENCY MEDICINE

## 2019-04-05 PROCEDURE — 95816 EEG AWAKE AND DROWSY: CPT

## 2019-04-05 PROCEDURE — 25010000002 FENTANYL CITRATE (PF) 250 MCG/5ML SOLUTION: Performed by: NURSE ANESTHETIST, CERTIFIED REGISTERED

## 2019-04-05 PROCEDURE — 25010000003 CEFAZOLIN PER 500 MG: Performed by: NURSE ANESTHETIST, CERTIFIED REGISTERED

## 2019-04-05 PROCEDURE — 80048 BASIC METABOLIC PNL TOTAL CA: CPT | Performed by: EMERGENCY MEDICINE

## 2019-04-05 PROCEDURE — 99284 EMERGENCY DEPT VISIT MOD MDM: CPT

## 2019-04-05 PROCEDURE — 71045 X-RAY EXAM CHEST 1 VIEW: CPT

## 2019-04-05 PROCEDURE — 25010000002 CEFAZOLIN PER 500 MG: Performed by: OTOLARYNGOLOGY

## 2019-04-05 PROCEDURE — 95816 EEG AWAKE AND DROWSY: CPT | Performed by: PSYCHIATRY & NEUROLOGY

## 2019-04-05 RX ORDER — IPRATROPIUM BROMIDE AND ALBUTEROL SULFATE 2.5; .5 MG/3ML; MG/3ML
3 SOLUTION RESPIRATORY (INHALATION) ONCE AS NEEDED
Status: DISCONTINUED | OUTPATIENT
Start: 2019-04-05 | End: 2019-04-05 | Stop reason: HOSPADM

## 2019-04-05 RX ORDER — HYDROCODONE BITARTRATE AND ACETAMINOPHEN 5; 325 MG/1; MG/1
1 TABLET ORAL EVERY 4 HOURS PRN
Status: DISCONTINUED | OUTPATIENT
Start: 2019-04-05 | End: 2019-04-06 | Stop reason: HOSPADM

## 2019-04-05 RX ORDER — ECHINACEA PURPUREA EXTRACT 125 MG
2 TABLET ORAL 3 TIMES DAILY
Status: DISCONTINUED | OUTPATIENT
Start: 2019-04-05 | End: 2019-04-06 | Stop reason: HOSPADM

## 2019-04-05 RX ORDER — MEPERIDINE HYDROCHLORIDE 25 MG/ML
12.5 INJECTION INTRAMUSCULAR; INTRAVENOUS; SUBCUTANEOUS
Status: DISCONTINUED | OUTPATIENT
Start: 2019-04-05 | End: 2019-04-05 | Stop reason: HOSPADM

## 2019-04-05 RX ORDER — CEFAZOLIN SODIUM 1 G/3ML
INJECTION, POWDER, FOR SOLUTION INTRAMUSCULAR; INTRAVENOUS AS NEEDED
Status: DISCONTINUED | OUTPATIENT
Start: 2019-04-05 | End: 2019-04-05 | Stop reason: SURG

## 2019-04-05 RX ORDER — PROPOFOL 10 MG/ML
VIAL (ML) INTRAVENOUS AS NEEDED
Status: DISCONTINUED | OUTPATIENT
Start: 2019-04-05 | End: 2019-04-05 | Stop reason: SURG

## 2019-04-05 RX ORDER — MIDAZOLAM HYDROCHLORIDE 1 MG/ML
2 INJECTION INTRAMUSCULAR; INTRAVENOUS
Status: CANCELLED | OUTPATIENT
Start: 2019-04-05

## 2019-04-05 RX ORDER — SODIUM CHLORIDE 0.9 % (FLUSH) 0.9 %
3 SYRINGE (ML) INJECTION EVERY 12 HOURS SCHEDULED
Status: CANCELLED | OUTPATIENT
Start: 2019-04-05

## 2019-04-05 RX ORDER — NALOXONE HCL 0.4 MG/ML
0.4 VIAL (ML) INJECTION AS NEEDED
Status: DISCONTINUED | OUTPATIENT
Start: 2019-04-05 | End: 2019-04-05 | Stop reason: HOSPADM

## 2019-04-05 RX ORDER — ONDANSETRON 2 MG/ML
INJECTION INTRAMUSCULAR; INTRAVENOUS AS NEEDED
Status: DISCONTINUED | OUTPATIENT
Start: 2019-04-05 | End: 2019-04-05 | Stop reason: SURG

## 2019-04-05 RX ORDER — OXYCODONE AND ACETAMINOPHEN 10; 325 MG/1; MG/1
1 TABLET ORAL ONCE AS NEEDED
Status: DISCONTINUED | OUTPATIENT
Start: 2019-04-05 | End: 2019-04-05 | Stop reason: HOSPADM

## 2019-04-05 RX ORDER — MAGNESIUM HYDROXIDE 1200 MG/15ML
LIQUID ORAL AS NEEDED
Status: DISCONTINUED | OUTPATIENT
Start: 2019-04-05 | End: 2019-04-05 | Stop reason: HOSPADM

## 2019-04-05 RX ORDER — SODIUM CHLORIDE, SODIUM LACTATE, POTASSIUM CHLORIDE, CALCIUM CHLORIDE 600; 310; 30; 20 MG/100ML; MG/100ML; MG/100ML; MG/100ML
INJECTION, SOLUTION INTRAVENOUS CONTINUOUS PRN
Status: DISCONTINUED | OUTPATIENT
Start: 2019-04-05 | End: 2019-04-05 | Stop reason: SURG

## 2019-04-05 RX ORDER — LABETALOL HYDROCHLORIDE 5 MG/ML
5 INJECTION, SOLUTION INTRAVENOUS
Status: DISCONTINUED | OUTPATIENT
Start: 2019-04-05 | End: 2019-04-05 | Stop reason: HOSPADM

## 2019-04-05 RX ORDER — ACETAMINOPHEN 500 MG
1000 TABLET ORAL ONCE
Status: CANCELLED | OUTPATIENT
Start: 2019-04-05 | End: 2019-04-05

## 2019-04-05 RX ORDER — SODIUM CHLORIDE AND POTASSIUM CHLORIDE 150; 450 MG/100ML; MG/100ML
100 INJECTION, SOLUTION INTRAVENOUS CONTINUOUS
Status: DISCONTINUED | OUTPATIENT
Start: 2019-04-05 | End: 2019-04-06 | Stop reason: HOSPADM

## 2019-04-05 RX ORDER — SUCCINYLCHOLINE CHLORIDE 20 MG/ML
INJECTION INTRAMUSCULAR; INTRAVENOUS AS NEEDED
Status: DISCONTINUED | OUTPATIENT
Start: 2019-04-05 | End: 2019-04-05 | Stop reason: SURG

## 2019-04-05 RX ORDER — DEXAMETHASONE SODIUM PHOSPHATE 4 MG/ML
INJECTION, SOLUTION INTRA-ARTICULAR; INTRALESIONAL; INTRAMUSCULAR; INTRAVENOUS; SOFT TISSUE AS NEEDED
Status: DISCONTINUED | OUTPATIENT
Start: 2019-04-05 | End: 2019-04-05 | Stop reason: SURG

## 2019-04-05 RX ORDER — MORPHINE SULFATE 2 MG/ML
6 INJECTION, SOLUTION INTRAMUSCULAR; INTRAVENOUS
Status: DISCONTINUED | OUTPATIENT
Start: 2019-04-05 | End: 2019-04-06 | Stop reason: HOSPADM

## 2019-04-05 RX ORDER — ASPIRIN 81 MG/1
81 TABLET ORAL DAILY
COMMUNITY

## 2019-04-05 RX ORDER — ONDANSETRON 2 MG/ML
4 INJECTION INTRAMUSCULAR; INTRAVENOUS ONCE AS NEEDED
Status: DISCONTINUED | OUTPATIENT
Start: 2019-04-05 | End: 2019-04-06 | Stop reason: HOSPADM

## 2019-04-05 RX ORDER — SODIUM CHLORIDE 9 MG/ML
125 INJECTION, SOLUTION INTRAVENOUS CONTINUOUS
Status: DISCONTINUED | OUTPATIENT
Start: 2019-04-05 | End: 2019-04-05

## 2019-04-05 RX ORDER — BUPIVACAINE HCL/0.9 % NACL/PF 0.1 %
2 PLASTIC BAG, INJECTION (ML) EPIDURAL EVERY 8 HOURS
Status: COMPLETED | OUTPATIENT
Start: 2019-04-05 | End: 2019-04-05

## 2019-04-05 RX ORDER — FENTANYL CITRATE 50 UG/ML
25 INJECTION, SOLUTION INTRAMUSCULAR; INTRAVENOUS AS NEEDED
Status: DISCONTINUED | OUTPATIENT
Start: 2019-04-05 | End: 2019-04-05 | Stop reason: HOSPADM

## 2019-04-05 RX ORDER — IBUPROFEN 600 MG/1
600 TABLET ORAL ONCE AS NEEDED
Status: DISCONTINUED | OUTPATIENT
Start: 2019-04-05 | End: 2019-04-05 | Stop reason: HOSPADM

## 2019-04-05 RX ORDER — OXYCODONE AND ACETAMINOPHEN 7.5; 325 MG/1; MG/1
2 TABLET ORAL EVERY 4 HOURS PRN
Status: DISCONTINUED | OUTPATIENT
Start: 2019-04-05 | End: 2019-04-05 | Stop reason: HOSPADM

## 2019-04-05 RX ORDER — HYDROCODONE BITARTRATE AND ACETAMINOPHEN 5; 325 MG/1; MG/1
1 TABLET ORAL EVERY 4 HOURS PRN
Qty: 18 TABLET | Refills: 0 | Status: SHIPPED | OUTPATIENT
Start: 2019-04-05 | End: 2019-04-08

## 2019-04-05 RX ORDER — SODIUM CHLORIDE, SODIUM LACTATE, POTASSIUM CHLORIDE, CALCIUM CHLORIDE 600; 310; 30; 20 MG/100ML; MG/100ML; MG/100ML; MG/100ML
100 INJECTION, SOLUTION INTRAVENOUS CONTINUOUS
Status: CANCELLED | OUTPATIENT
Start: 2019-04-05

## 2019-04-05 RX ORDER — ONDANSETRON 2 MG/ML
4 INJECTION INTRAMUSCULAR; INTRAVENOUS ONCE AS NEEDED
Status: DISCONTINUED | OUTPATIENT
Start: 2019-04-05 | End: 2019-04-05 | Stop reason: HOSPADM

## 2019-04-05 RX ORDER — SODIUM CHLORIDE 0.9 % (FLUSH) 0.9 %
1-10 SYRINGE (ML) INJECTION AS NEEDED
Status: CANCELLED | OUTPATIENT
Start: 2019-04-05

## 2019-04-05 RX ORDER — FENTANYL CITRATE 50 UG/ML
INJECTION, SOLUTION INTRAMUSCULAR; INTRAVENOUS AS NEEDED
Status: DISCONTINUED | OUTPATIENT
Start: 2019-04-05 | End: 2019-04-05 | Stop reason: SURG

## 2019-04-05 RX ORDER — NALOXONE HCL 0.4 MG/ML
0.4 VIAL (ML) INJECTION
Status: DISCONTINUED | OUTPATIENT
Start: 2019-04-05 | End: 2019-04-06 | Stop reason: HOSPADM

## 2019-04-05 RX ORDER — LIDOCAINE HYDROCHLORIDE 40 MG/ML
SOLUTION TOPICAL AS NEEDED
Status: DISCONTINUED | OUTPATIENT
Start: 2019-04-05 | End: 2019-04-05 | Stop reason: SURG

## 2019-04-05 RX ORDER — METOCLOPRAMIDE HYDROCHLORIDE 5 MG/ML
5 INJECTION INTRAMUSCULAR; INTRAVENOUS
Status: DISCONTINUED | OUTPATIENT
Start: 2019-04-05 | End: 2019-04-05 | Stop reason: HOSPADM

## 2019-04-05 RX ORDER — LIDOCAINE HYDROCHLORIDE 20 MG/ML
INJECTION, SOLUTION INFILTRATION; PERINEURAL AS NEEDED
Status: DISCONTINUED | OUTPATIENT
Start: 2019-04-05 | End: 2019-04-05 | Stop reason: SURG

## 2019-04-05 RX ORDER — ROCURONIUM BROMIDE 10 MG/ML
INJECTION, SOLUTION INTRAVENOUS AS NEEDED
Status: DISCONTINUED | OUTPATIENT
Start: 2019-04-05 | End: 2019-04-05 | Stop reason: SURG

## 2019-04-05 RX ORDER — MIDAZOLAM HYDROCHLORIDE 1 MG/ML
1 INJECTION INTRAMUSCULAR; INTRAVENOUS
Status: CANCELLED | OUTPATIENT
Start: 2019-04-05

## 2019-04-05 RX ADMIN — PROPOFOL 200 MG: 10 INJECTION, EMULSION INTRAVENOUS at 06:31

## 2019-04-05 RX ADMIN — ONDANSETRON HYDROCHLORIDE 4 MG: 2 SOLUTION INTRAMUSCULAR; INTRAVENOUS at 07:13

## 2019-04-05 RX ADMIN — EPHEDRINE SULFATE 20 MG: 50 INJECTION INTRAMUSCULAR; INTRAVENOUS; SUBCUTANEOUS at 07:00

## 2019-04-05 RX ADMIN — LIDOCAINE HYDROCHLORIDE 1 EACH: 40 SOLUTION TOPICAL at 06:31

## 2019-04-05 RX ADMIN — LIDOCAINE HYDROCHLORIDE 100 MG: 20 INJECTION, SOLUTION INFILTRATION; PERINEURAL at 06:31

## 2019-04-05 RX ADMIN — CEFAZOLIN 2 G: 1 INJECTION, POWDER, FOR SOLUTION INTRAVENOUS at 06:43

## 2019-04-05 RX ADMIN — Medication 2 SPRAY: at 09:47

## 2019-04-05 RX ADMIN — SODIUM CHLORIDE, POTASSIUM CHLORIDE, SODIUM LACTATE AND CALCIUM CHLORIDE: 600; 310; 30; 20 INJECTION, SOLUTION INTRAVENOUS at 06:25

## 2019-04-05 RX ADMIN — Medication 2 SPRAY: at 15:00

## 2019-04-05 RX ADMIN — SODIUM CHLORIDE 2 G: 9 INJECTION, SOLUTION INTRAVENOUS at 21:01

## 2019-04-05 RX ADMIN — DEXAMETHASONE SODIUM PHOSPHATE 4 MG: 4 INJECTION, SOLUTION INTRAMUSCULAR; INTRAVENOUS at 07:13

## 2019-04-05 RX ADMIN — POTASSIUM CHLORIDE AND SODIUM CHLORIDE 100 ML/HR: 450; 150 INJECTION, SOLUTION INTRAVENOUS at 09:41

## 2019-04-05 RX ADMIN — FENTANYL CITRATE 150 MCG: 50 INJECTION INTRAMUSCULAR; INTRAVENOUS at 06:31

## 2019-04-05 RX ADMIN — Medication 2 SPRAY: at 20:55

## 2019-04-05 RX ADMIN — SODIUM CHLORIDE 2 G: 9 INJECTION, SOLUTION INTRAVENOUS at 13:52

## 2019-04-05 RX ADMIN — SUCCINYLCHOLINE CHLORIDE 140 MG: 20 INJECTION, SOLUTION INTRAMUSCULAR; INTRAVENOUS at 06:31

## 2019-04-05 RX ADMIN — SODIUM CHLORIDE 125 ML/HR: 9 INJECTION, SOLUTION INTRAVENOUS at 05:19

## 2019-04-05 RX ADMIN — EPHEDRINE SULFATE 20 MG: 50 INJECTION INTRAMUSCULAR; INTRAVENOUS; SUBCUTANEOUS at 06:41

## 2019-04-05 RX ADMIN — MUPIROCIN 1 APPLICATION: 20 OINTMENT TOPICAL at 09:47

## 2019-04-05 RX ADMIN — Medication 1 APPLICATION: at 21:34

## 2019-04-05 RX ADMIN — TETANUS TOXOID, REDUCED DIPHTHERIA TOXOID AND ACELLULAR PERTUSSIS VACCINE, ADSORBED 0.5 ML: 5; 2.5; 8; 8; 2.5 SUSPENSION INTRAMUSCULAR at 05:17

## 2019-04-05 RX ADMIN — ROCURONIUM BROMIDE 5 MG: 10 INJECTION INTRAVENOUS at 06:31

## 2019-04-05 NOTE — ED NOTES
PT WAS AT WORK AT [employer], NEXT THING HE KNOWS HE IS ON THE GROUND WITH FACIAL TRAUMA.   LACERATION ABOVE RIGHT EYEBROW,  DEEP VERTICAL LACERATION FROM THE BRIDGE TO FRENULUM.    PT WAS TAKEN TO [facility] FOR EVALUATION AND TREATMENT.  TWO IV SITES, TORADOL, ZOFRAN,ANCEF, MORPHINE. PT TRANSFERRED TO [facility]  TO BE EVALUATED BY ENT.      UNKNOWN TIME OF INJURY AND WHAT EXACTLY HAPPENED.         Chelle Rodrigues, RN  04/05/19 8887                 Chelle Rodrigues, RN  04/05/19 1899

## 2019-04-05 NOTE — ANESTHESIA PREPROCEDURE EVALUATION
Anesthesia Evaluation     NPO Solid Status: > 8 hours  NPO Liquid Status: > 8 hours           Airway   Mallampati: II  TM distance: >3 FB  No difficulty expected  Dental - normal exam     Pulmonary - negative pulmonary ROS and normal exam   Cardiovascular - normal exam    ECG reviewed  Rhythm: regular  Rate: normal    (+) hypertension well controlled, valvular problems/murmurs AS, hyperlipidemia,       Neuro/Psych  (+) syncope,     GI/Hepatic/Renal/Endo - negative ROS     Musculoskeletal (-) negative ROS    Abdominal  - normal exam   Substance History   (+) alcohol use,      OB/GYN negative ob/gyn ROS         Other - negative ROS                       Anesthesia Plan    ASA 2 - emergent     general     Anesthetic plan, all risks, benefits, and alternatives have been provided, discussed and informed consent has been obtained with: patient.  Use of blood products discussed with patient  Consented to blood products.   Plan discussed with CRNA.

## 2019-04-05 NOTE — DISCHARGE SUMMARY
ENT/[facility] ([facility]) Discharge Summary:    Date of Admission: 4/5/2019  Date of Discharge:  4/5/2019    Discharge Diagnosis: Facial laceration    Presenting Problem/History of Present Illness  Facial laceration, initial encounter [S01.81XA]       Hospital Course  Patient is a 58 y.o. male presented with a facial laceration after a syncopal event at work. The patient had the repair done and was admitted for observation. The patient is doing well and stable to discharge. He is tolerating oral intake and medications without difficulty.      Procedures Performed  Procedure(s):  Irrigation debridement and repair of a complex stellate through and through nasal laceration including a 7.3 cm vertical laceration through the nasal mucosa over the nasal dorsum extending to the left nasal ala Repair of 0.5 cm laceration to the left lateral nasal ala Irrigation debridement and repair of 2.1 cm laceration of the right lateral brow\forehead Bilateral diagnostic rigid nasal endoscopy with repair of complex stellate 2.3 cm intranasal laceration of the anterior inferior turbinate       Consults:   Consults     Date and Time Order Name Status Description    4/5/2019 0729 Inpatient Neurology Consult General      4/5/2019 0729 Inpatient Cardiology Consult Completed           Pertinent Test Results: labs: reviewed and radiology: reviewed    Condition on Discharge:  Stable    Vital Signs  Temp:  [97.3 °F (36.3 °C)-97.8 °F (36.6 °C)] 97.5 °F (36.4 °C)  Heart Rate:  [67-95] 88  Resp:  [16-17] 16  BP: (121-137)/(75-96) 125/85    Physical Exam:   Physical Exam   Constitutional: He is oriented to person, place, and time. He appears well-developed and well-nourished. No distress.   HENT:   Head: Normocephalic and atraumatic.       Right Ear: External ear normal.   Left Ear: External ear normal.   Nose: Nose normal.   Mouth/Throat: Oropharynx is clear and moist.   Eyes: Conjunctivae and EOM are normal. Pupils are equal, round, and reactive to light.  Right eye exhibits no discharge. Left eye exhibits no discharge. No scleral icterus.   Pulmonary/Chest: Effort normal.   Neurological: He is alert and oriented to person, place, and time.   Skin: Skin is warm and dry. He is not diaphoretic.   Psychiatric: He has a normal mood and affect. His behavior is normal. Judgment and thought content normal.   Vitals reviewed.      Discharge Disposition  Home or Self Care    Discharge Medications     Discharge Medications      New Medications      Instructions Start Date   HYDROcodone-acetaminophen 5-325 MG per tablet  Commonly known as:  NORCO   1 tablet, Oral, Every 4 Hours PRN         Continue These Medications      Instructions Start Date   MULTIVITAMIN ADULT PO   Oral         Stop These Medications    aspirin 81 MG EC tablet            Discharge Diet:   Diet Instructions     Diet: Regular      Discharge Diet:  Regular          Activity at Discharge:   Activity Instructions     Discharge Activity      Light activity for 1 week, no driving or travel for 1 week          Follow-up Appointments  No future appointments.  Additional Instructions for the Follow-ups that You Need to Schedule     Discharge Follow-up with Specialty: ENT   As directed      Specialty:  ENT    Follow Up Details:  Follow-up with nurse at ENT in 10 days for suture removal                 SLIM Patel  04/05/19  12:48 PM

## 2019-04-05 NOTE — PLAN OF CARE
Problem: Patient Care Overview  Goal: Plan of Care Review  Outcome: Ongoing (interventions implemented as appropriate)   04/05/19 1829   Coping/Psychosocial   Plan of Care Reviewed With patient;spouse   Plan of Care Review   Progress improving   OTHER   Outcome Summary Pt admitted to  following an irrigation and repair of nasal lac per Dr. Traylor. Sutures are present along the middle of his nose. Lac to R brow and forehead. Cardiology and neurology consulted. Echo performed. EEG performed. Tele: NSR. Bactroban and ocean spray 3X daily. Regular diet, tolerating well. Voiding. Anxious to return home. Awaiting Neurology to see pt. Will continue to monitor.       Problem: Surgery Nonspecified (Adult)  Goal: Signs and Symptoms of Listed Potential Problems Will be Absent, Minimized or Managed (Surgery Nonspecified)  Outcome: Ongoing (interventions implemented as appropriate)   04/05/19 1829   Goal/Outcome Evaluation   Problems Assessed (Surgery) all   Problems Present (Surgery) none
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
## 2019-04-05 NOTE — ANESTHESIA POSTPROCEDURE EVALUATION
"Patient: Jose Juan Ann    Procedure Summary     Date:  04/05/19 Room / Location:   [facility] OR  /  [facility]    Anesthesia Start:  0625 Anesthesia Stop:  0737    Procedure:  Irrigation debridement and repair of a complex stellate through and through nasal laceration including a 7.3 cm vertical laceration through the nasal mucosa over the nasal dorsum extending to the left nasal ala Repair of 0.5 cm laceration to the left lateral nasal ala Irrigation debridement and repair of 2.1 cm laceration of the right lateral brow\forehead Bilateral diagnostic rigid nasal endoscopy with repair of complex stellate 2.3 cm intranasal laceration of the anterior inferior turbinate (Bilateral ) Diagnosis:       Facial laceration, initial encounter      Open fracture of facial bone, unspecified facial bone, initial encounter (CMS/HCC)      (Facial laceration, initial encounter [S01.81XA])      (Open fracture of facial bone, unspecified facial bone, initial encounter (CMS/[facility]) [S02.92XB])    Surgeon:  Manfred Traylor MD Provider:  Bishnu Delgado CRNA    Anesthesia Type:  general ASA Status:  2 - Emergent          Anesthesia Type: general  Last vitals  BP   135/84 (04/05/19 0832)   Temp   97.3 °F (36.3 °C) (04/05/19 0832)   Pulse   79 (04/05/19 0832)   Resp   16 (04/05/19 0832)     SpO2   97 % (04/05/19 0832)     Post Anesthesia Care and Evaluation    Patient location during evaluation: PACU  Patient participation: complete - patient participated  Level of consciousness: awake and alert  Pain management: adequate  Airway patency: patent  Anesthetic complications: No anesthetic complications    Cardiovascular status: acceptable  Respiratory status: acceptable  Hydration status: acceptable    Comments: Blood pressure 135/84, pulse 79, temperature 97.3 °F (36.3 °C), temperature source Oral, resp. rate 16, height 180.3 cm (71\"), weight 82.6 kg (182 lb 2 oz), SpO2 97 %.    Pt discharged from PACU based on desiree score >8      "

## 2019-04-05 NOTE — OP NOTE
OPERATIVE NOTE  4/5/2019    NAME: Jose Juan Ann    YOB: 1960  MRN: [id number]    PRE-OPERATIVE DIAGNOSIS:    Facial laceration, initial encounter [S01.81XA]  Open fracture of facial bone, unspecified facial bone, initial encounter (CMS/[facility]) [S02.92XB]    POST-OPERATIVE DIAGNOSIS:   Post-Op Diagnosis Codes:     * Facial laceration, initial encounter [S01.81XA]     * Open fracture of facial bone, unspecified facial bone, initial encounter (CMS/[facility]) [S02.92XB]    PROCEDURE PERFORMED:   Irrigation debridement and repair of a complex stellate through and through nasal laceration including a 7.3 cm vertical laceration through the nasal mucosa over the nasal dorsum extending to the left nasal ala  Repair of 0.5 cm laceration to the left lateral nasal ala  Irrigation debridement and repair of 2.1 cm laceration of the right lateral brow\forehead  Bilateral diagnostic rigid nasal endoscopy with repair of complex stellate 2.3 cm intranasal laceration of the anterior inferior turbinate    SURGEON:   Manfred Traylor MD    ASSISTANT(S):   None    ANESTHESIA:   General Anesthesia via Endotracheal Tube    INDICATIONS: The patient is a 58 y.o. male with Facial laceration, initial encounter [S01.81XA]  Open fracture of facial bone, unspecified facial bone, initial encounter (CMS/[facility]) [S02.92XB]    PROCEDURE:  The patient was brought to the operating room, given General Anesthesia via Endotracheal Tube, and prepped and draped in the usual manner.     Approximately 5 cc 1% Xylocaine with epinephrine was injected into the nasal area and inspection of the wound demonstrated a through and through 7.3 cm laceration which involve the left nasal bone through to the superior vault of the nasal mucosa.  Interrupted 4-0 chromic was utilized to repair the nasal mucosa and to stabilize the left nasal bone.  Bilateral rigid nasal endoscopy was performed and demonstrated a large right septal deviation but no significant mucosal  aberrations or lacerations.  An anterior inferior turbinate laceration was noted and sutured through the left nasal vestibule utilizing interrupted 4-0 chromic.  The laceration was 2.3 cm.    Subsequently interrupted 4-0 Monocryl was utilized to repair the subcutaneous tissues and the nasal dorsum and the 7.3 cm laceration/abrasion was closed utilizing interrupted 5-0 nylon to reapproximate the epidermis.    Irrigation debridement repair was subsequently performed of the right lateral brow\forehead laceration.  The simple closure was performed with a 2.1 cm laceration without difficulty.  There was an abrasion in the area of the left temple which was treated with Bactroban ointment only.  Subsequently Bactroban ointment was applied to all lacerations and the procedure terminated.     The patient tolerated the procedure well without complications and was transported to the postanesthesia care unit in stable condition.    SPECIMENS:  None    COMPLICATIONS: NONE    ESTIMATED BLOOD LOSS:  < 25 cc    Manfred Traylor MD  4/5/2019

## 2019-04-05 NOTE — ANESTHESIA PROCEDURE NOTES
Airway  Urgency: elective    Date/Time: 4/5/2019 6:32 AM  Airway not difficult    General Information and Staff    Patient location during procedure: OR  CRNA: Mike Ruano CRNA    Indications and Patient Condition  Indications for airway management: airway protection    Preoxygenated: yes  Mask difficulty assessment: 0 - not attempted    Final Airway Details  Final airway type: endotracheal airway      Successful airway: ETT  Cuffed: yes   Successful intubation technique: video laryngoscopy  Facilitating devices/methods: intubating stylet  Endotracheal tube insertion site: oral  Blade: Sloan  Blade size: 4  ETT size (mm): 7.5  Cormack-Lehane Classification: grade I - full view of glottis  Placement verified by: chest auscultation and capnometry   Measured from: lips  ETT to lips (cm): 21  Number of attempts at approach: 1

## 2019-04-05 NOTE — CONSULTS
[facility] HEART GROUP CONSULT NOTE    Referring Provider: Dr. Manfred Traylor    Reason for Consultation: syncope    Chief Complaint   Patient presents with   • Facial Injury       Subjective .     History of present illness:  Jose Juan Ann is a 58 y.o. male with a known PMH significant for aortic stenosis with AVR and LAAE with AtriClip in July of 2016, HLD, and tobacco abuse. Due to multiple syncopal spells, prior to and after valve replacement, he had a loop recorder placed with no revealing arrhythmias at last interrogation. He was last seen in our office in 2017 where he had an echo and did not follow up after that time. He presented to an outside emergency room last night after a syncopal episode at work.  He states that he was working feeling of his usual state, had starting have a spell that he describes as his blood sugar being low or feeling weak, and the next thing he remembers is that he was in an ambulance. After transfer to our ED he was evaluated by ENT and taken to OR this am for repair of facial laceration.     He has been under extreme stress this year, and actually for quite some time. As mentioned previous, he has had syncopal episodes in the past thought to be related to his AS.  However, after his AVR he had further syncope. No arrhythmia has been detected on his loop recorder in the past.    Due to his history of AVR and loop recorder placement for syncope cardiology was asked to evaluate the patient. EKG showed SR.    History  Past Medical History:   Diagnosis Date   • Aortic valve stenosis    • History of loop recorder    • Hyperlipidemia    • Hypertension    • Palpitations    • Syncope    • Tobacco use    ,   Past Surgical History:   Procedure Laterality Date   • AORTIC VALVE REPAIR/REPLACEMENT     • ATRIAL APPENDAGE EXCLUSION LEFT WITH TRANSESOPHAGEAL ECHOCARDIOGRAM     • CARDIAC VALVE REPLACEMENT     • CORONARY ARTERY BYPASS GRAFT     ,   Family History   Problem Relation Age of  Onset   • Hypertension Mother    • Diabetes Mother    • Heart valve disorder Father    • Hypertension Father    • Heart disease Father    ,   Social History     Tobacco Use   • Smoking status: Former Smoker   • Smokeless tobacco: Never Used   Substance Use Topics   • Alcohol use: Yes     Alcohol/week: 9.0 oz     Types: 15 Cans of beer per week     Comment: 3-5 daily   • Drug use: No   ,     Medications  Current Facility-Administered Medications   Medication Dose Route Frequency Provider Last Rate Last Dose   • ceFAZolin in 0.9% normal saline (ANCEF) IVPB solution 2 g  2 g Intravenous Q8H Manfred Traylor MD       • HYDROcodone-acetaminophen (NORCO) 5-325 MG per tablet 1 tablet  1 tablet Oral Q4H PRN Manfred Traylor MD       • ibuprofen (ADVIL,MOTRIN) 100 MG/5ML suspension 400 mg  400 mg Oral Q6H PRN Manfred Traylor MD       • morphine injection 6 mg  6 mg Intravenous Q2H PRN Manfred Traylor MD        And   • naloxone (NARCAN) injection 0.4 mg  0.4 mg Intravenous Q5 Min PRN Manfred Traylor MD       • mupirocin (BACTROBAN) 2 % ointment 1 application  1 application Topical Q12H Manfred Traylor MD       • ondansetron (ZOFRAN) injection 4 mg  4 mg Intravenous Once PRN Manfred Traylor MD       • sodium chloride (OCEAN) nasal spray 2 spray  2 spray Each Nare TID Manfred Traylor MD       • sodium chloride 0.45 % with KCl 20 mEq/L infusion  100 mL/hr Intravenous Continuous Manfred Traylor  mL/hr at 04/05/19 0941 100 mL/hr at 04/05/19 0941   • sodium chloride 0.9 % infusion  125 mL/hr Intravenous Continuous Moses Schilling  mL/hr at 04/05/19 0519 125 mL/hr at 04/05/19 0519       Allergies:  Patient has no known allergies.    Review of Systems  Review of Systems   Constitution: Negative for chills, diaphoresis, fever, weakness and malaise/fatigue.   HENT: Positive for nosebleeds.    Eyes: Positive for pain.   Cardiovascular: Positive for syncope. Negative for chest  "pain, dyspnea on exertion, irregular heartbeat, leg swelling, near-syncope, orthopnea, palpitations and paroxysmal nocturnal dyspnea.   Respiratory: Negative for cough, shortness of breath and wheezing.    Musculoskeletal: Positive for falls.   Gastrointestinal: Negative for bloating, abdominal pain, nausea and vomiting.   Neurological: Negative for dizziness and focal weakness.       Objective     Physical Exam:  Patient Vitals for the past 24 hrs:   BP Temp Temp src Pulse Resp SpO2 Height Weight   04/05/19 0900 125/85 97.5 °F (36.4 °C) Oral 88 16 96 % -- --   04/05/19 0832 135/84 97.3 °F (36.3 °C) Oral 79 16 97 % -- --   04/05/19 0810 136/86 -- -- 88 16 96 % -- --   04/05/19 0800 121/95 -- -- 95 16 96 % -- --   04/05/19 0750 137/76 -- -- -- 16 -- -- --   04/05/19 0745 134/96 -- -- 91 16 96 % -- --   04/05/19 0740 130/77 -- -- 95 16 97 % -- --   04/05/19 0735 122/77 -- -- 75 16 99 % -- --   04/05/19 0732 122/77 97.8 °F (36.6 °C) Temporal 72 16 100 % -- --   04/05/19 0603 -- -- -- 67 -- 97 % -- --   04/05/19 0601 126/75 97.6 °F (36.4 °C) -- 67 17 97 % -- --   04/05/19 0540 -- -- -- -- -- -- 180.3 cm (71\") --   04/05/19 0516 137/79 -- -- 68 -- 96 % -- --   04/05/19 0446 135/79 -- -- 67 -- 97 % -- --   04/05/19 0433 130/84 -- -- 89 -- 94 % -- --   04/05/19 0423 -- 97.6 °F (36.4 °C) Axillary -- -- -- -- --   04/05/19 0413 134/92 -- -- 90 17 95 % -- 82.6 kg (182 lb 2 oz)     Physical Exam   Constitutional: He is oriented to person, place, and time. He appears well-developed and well-nourished. No distress.   HENT:   Head: Normocephalic.   Right Ear: External ear normal.   Left Ear: External ear normal.   Nose: Nose lacerations present.       Laceration with Suture repair    Eyes: Right eye exhibits no discharge. Left eye exhibits no discharge.   Bruising to bilateral eyes   Neck: Normal range of motion. Neck supple.   Cardiovascular: Normal rate, regular rhythm and normal heart sounds.   Pulmonary/Chest: Effort normal " and breath sounds normal. No respiratory distress. He has no wheezes. He has no rales.   Abdominal: Soft. Bowel sounds are normal. He exhibits no distension. There is no tenderness.   Neurological: He is alert and oriented to person, place, and time.   Skin: Skin is warm and dry. No rash noted. He is not diaphoretic. No erythema. No pallor.   Psychiatric: He has a normal mood and affect. His behavior is normal.   Vitals reviewed.      Results Review:   I reviewed the patient's new clinical results.    Lab Results (last 72 hours)     Procedure Component Value Units Date/Time    Basic Metabolic Panel [[id number]]  (Abnormal) Collected:  04/05/19 0436    Specimen:  Blood Updated:  04/05/19 0455     Glucose 96 mg/dL      BUN 21 mg/dL      Creatinine 0.74 mg/dL      Sodium 139 mmol/L      Potassium 4.3 mmol/L      Chloride 103 mmol/L      CO2 28.0 mmol/L      Calcium 8.9 mg/dL      eGFR Non African Amer 109 mL/min/1.73      BUN/Creatinine Ratio 28.4     Anion Gap 8.0 mmol/L     Narrative:       GFR Normal >60  Chronic Kidney Disease <60  Kidney Failure <15    Protime-INR [[id number]]  (Normal) Collected:  04/05/19 0436    Specimen:  Blood Updated:  04/05/19 0453     Protime 12.9 Seconds      INR 0.94    aPTT [[id number]]  (Normal) Collected:  04/05/19 0436    Specimen:  Blood Updated:  04/05/19 0453     PTT 31.5 seconds     CBC & Differential [[id number]] Collected:  04/05/19 0436    Specimen:  Blood Updated:  04/05/19 0444    Narrative:       The following orders were created for panel order CBC & Differential.  Procedure                               Abnormality         Status                     ---------                               -----------         ------                     CBC Auto Differential[[id number]]        Abnormal            Final result                 Please view results for these tests on the individual orders.    CBC Auto Differential [[id number]]  (Abnormal) Collected:  04/05/19 0436    Specimen:  Blood  Updated:  04/05/19 0444     WBC 14.21 10*3/mm3      RBC 4.63 10*6/mm3      Hemoglobin 13.4 g/dL      Hematocrit 39.5 %      MCV 85.3 fL      MCH 28.9 pg      MCHC 33.9 g/dL      RDW 14.1 %      RDW-SD 43.6 fl      MPV 10.4 fL      Platelets 219 10*3/mm3      Neutrophil % 86.7 %      Lymphocyte % 6.4 %      Monocyte % 6.3 %      Eosinophil % 0.0 %      Basophil % 0.2 %      Immature Grans % 0.4 %      Neutrophils, Absolute 12.32 10*3/mm3      Lymphocytes, Absolute 0.91 10*3/mm3      Monocytes, Absolute 0.89 10*3/mm3      Eosinophils, Absolute 0.00 10*3/mm3      Basophils, Absolute 0.03 10*3/mm3      Immature Grans, Absolute 0.06 10*3/mm3      nRBC 0.0 /100 WBC             Imaging Results (last 72 hours)     Procedure Component Value Units Date/Time    XR Chest 1 View [[id number]] Collected:  04/05/19 0707     Updated:  04/05/19 0711    Narrative:       EXAMINATION: XR CHEST 1 VW-     4/5/2019 5:15 AM CDT     HISTORY: pre-op; S01.81XA-Laceration without foreign body of other part  of head, initial encounter; S02.92XB-Unspecified fracture of facial  bones, initial encounter for open fracture.     One view chest x-ray compared with 02/18/2017.     Normal heart and mediastinum.  Aortic arch calcification.  Median sternotomy with prosthetic heart valve.     Adequately expanded lungs with a few granulomas.     No infiltrate or heart failure.     No pneumothorax.     Summary:  1. Stable chest x-ray with no acute disease.  This report was finalized on 04/05/2019 07:08 by Dr. Dannie Page MD.          Assessment     1. Syncope and Collapse  2. Facial Laceration  3. Aortic Stenosis status post AVR   4. S/p LAAE with AtriClip  5. Stress     Plan     - echocardiogram  - loop recorder interrogation - complete with no findings  - advise follow up with PCP for consideration of treatment of stress and anxiety  - Further orders per Dr. Zheng upon his evaluation of the patient.       Thank you for the consultation.        Ivelisse TARANGO  TRAV Kelley  4/5/2019  9:44 AM      Please note this cardiology consultation note is the result of a face to face consultation with the patient, in addition to reviewing medical records at length by myself, TRAV Castañeda.       Time: approximately 35 minutes

## 2019-04-05 NOTE — ED PROVIDER NOTES
Subjective   This is a 58-year-old male who was transferred to this emergency department from [facility].  Patient was seen there after a presumed industrial accident while at work.  Exact time of injury and mechanism of injury is unclear.  He was found on the ground with significant facial injuries.  He was initially seen at [facility] where he was found to have several facial lacerations including a significant laceration to the nose.  CTs were performed which showed multiple facial fractures.  Dr. Ahmadi was reportedly contacted as was Dr. Manfred Traylor, ENT who had requested transfer to this emergency department.  Labs were drawn and he was provided pain medication, nausea medication, and antibiotics.  Patient reports his pain is controlled at this time.  He has a history of chronic back pain which is present but is at baseline.  Reports facial discomfort but no other acute trauma.  He denies any recent illness or fever.  He does not feel short of breath.  No cough or wheezing.  No chest pain, palpitations, or dizziness.  No GI/ complaints.  No paresthesias or unilateral/focal motor weakness.  He has no additional concerns.  Last tetanus is unknown.  Last meal was around 11pm.            Review of Systems   All other systems reviewed and are negative.      Past Medical History:   Diagnosis Date   • Aortic valve stenosis    • History of loop recorder    • Hyperlipidemia    • Hypertension    • Palpitations    • Syncope    • Tobacco use        No Known Allergies    Past Surgical History:   Procedure Laterality Date   • AORTIC VALVE REPAIR/REPLACEMENT     • ATRIAL APPENDAGE EXCLUSION LEFT WITH TRANSESOPHAGEAL ECHOCARDIOGRAM     • CARDIAC VALVE REPLACEMENT     • CORONARY ARTERY BYPASS GRAFT         Family History   Problem Relation Age of Onset   • Hypertension Mother    • Diabetes Mother    • Heart valve disorder Father    • Hypertension Father    • Heart disease Father         Social History     Socioeconomic History   • Marital status:      Spouse name: Not on file   • Number of children: Not on file   • Years of education: Not on file   • Highest education level: Not on file   Tobacco Use   • Smoking status: Former Smoker   • Smokeless tobacco: Never Used   Substance and Sexual Activity   • Alcohol use: Yes     Alcohol/week: 9.0 oz     Types: 15 Cans of beer per week     Comment: 3-5 daily   • Drug use: No   • Sexual activity: Defer           Objective   Physical Exam   Constitutional: He is oriented to person, place, and time. He appears well-developed and well-nourished.   HENT:   Head: Normocephalic.   Small approximately 1.5-2 cm horizontal laceration over the right eyebrow on the lower forehead.  Small skin avulsion with surrounding bruising to the left temple.  Patient has a significant and deep laceration extending from the nasal bridge all the way to the tip of the nose which appears full-thickness through the nasal cartilage.  No active epistaxis or bleeding from the laceration.  Patient does have discomfort over the bilateral maxilla with early bruising and edema present.  No evidence for a primary globe/ocular injury.   Eyes: EOM are normal. Pupils are equal, round, and reactive to light.   Neck: Normal range of motion. Neck supple. No JVD present. No tracheal deviation present.   Nontender   Cardiovascular: Normal rate, regular rhythm and normal heart sounds.   Pulmonary/Chest: Effort normal and breath sounds normal. No respiratory distress. He has no wheezes. He has no rales. He exhibits no tenderness.   Abdominal: Soft. Bowel sounds are normal. There is no tenderness. There is no guarding.   Musculoskeletal: He exhibits no edema or deformity.   Neurological: He is alert and oriented to person, place, and time. No cranial nerve deficit or sensory deficit. He exhibits normal muscle tone. Coordination normal.   Skin: Skin is warm and dry. Capillary refill takes  less than 2 seconds.   Psychiatric: He has a normal mood and affect. His behavior is normal.   Nursing note and vitals reviewed.      Procedures           ED Course  ED Course as of Apr 05 0502 Fri Apr 05, 2019   0501 Protime: 12.9 [MW]   0501 INR: 0.94 [MW]   0501 Hemoglobin: (!) 13.4 [MW]   0501 Hematocrit: (!) 39.5 [MW]      ED Course User Index  [MW] Moses Schilling,       Transfer packet reviewed  Patient has declined medication for pain    Labs drawn and reviewed  Tdap ordered    EKG at 5:06 AM shows a sinus rhythm with a rate of 62 bpm.  Intervals within normal limits.  Normal axis.  Poor R wave progression.  Suggested T wave inversions in aVL.  No ST elevation evidence for acute ischemia/infarction.    X-ray of the chest as interpreted by myself shows no acute cardiopulmonary process    I spoke with Dr. Traylor who will take the patient to the OR for repair            MDM  Transfer packet reviewed  Patient's pain is controlled  Labs repeated  Imaging reviewed  Tdap ordered  Pre-op EKG and chest x-ray ordered and reviewed  I spoke directly with Dr. Traylor will take the patient to the OR for repair      Final diagnoses:   Facial laceration, initial encounter   Open fracture of facial bone, unspecified facial bone, initial encounter (CMS/[facility])            Moses Schilling DO  04/05/19 0520

## 2019-04-12 DIAGNOSIS — I35.0 CALCIFIC AORTIC VALVE STENOSIS: ICD-10-CM

## 2019-04-12 DIAGNOSIS — Z95.2 S/P AVR (AORTIC VALVE REPLACEMENT): ICD-10-CM

## 2019-04-12 DIAGNOSIS — R55 SYNCOPE, UNSPECIFIED SYNCOPE TYPE: Primary | ICD-10-CM

## 2019-04-15 ENCOUNTER — OFFICE VISIT (OUTPATIENT)
Dept: OTOLARYNGOLOGY | Facility: CLINIC | Age: 59
End: 2019-04-15

## 2019-04-15 DIAGNOSIS — E04.1 THYROID NODULE: Primary | ICD-10-CM

## 2019-04-15 DIAGNOSIS — R55 SYNCOPE, UNSPECIFIED SYNCOPE TYPE: ICD-10-CM

## 2019-04-15 PROCEDURE — 99024 POSTOP FOLLOW-UP VISIT: CPT | Performed by: PHYSICIAN ASSISTANT

## 2019-04-15 NOTE — PATIENT INSTRUCTIONS
Will continue treatment as directed. Will keep on light duty for 4 weeks. Ordered Tilt table test and carotid ultrasound.

## 2019-04-15 NOTE — PROGRESS NOTES
SLIM Patel   History Of Present Illness:  Jose Juan Ann is a 58 y.o. male who presents for suture removal after surgery.    Physical exam:  sutures removed, wound healing appropriately without overt infection or inflammation        Left upper front teeth numbness    Assessment:  Postoperative Examination Following Surgery V67.00    Plan:  call for wound redness, swelling or breakdown   Wound care instructions given     Will get tilt table test and carotid ultrasound.     Will keep on light duty restriction for four weeks or at least until testing is done.    SLIM Patel  4/15/2019   9:45 AM

## 2019-05-07 ENCOUNTER — HOSPITAL ENCOUNTER (OUTPATIENT)
Dept: CARDIOLOGY | Facility: HOSPITAL | Age: 59
Discharge: HOME | End: 2019-05-07
Admitting: PHYSICIAN ASSISTANT

## 2019-05-07 ENCOUNTER — HOSPITAL ENCOUNTER (OUTPATIENT)
Dept: ULTRASOUND IMAGING | Facility: HOSPITAL | Age: 59
Discharge: HOME | End: 2019-05-07

## 2019-05-07 DIAGNOSIS — R55 SYNCOPE, UNSPECIFIED SYNCOPE TYPE: ICD-10-CM

## 2019-05-07 DIAGNOSIS — Z95.2 S/P AVR (AORTIC VALVE REPLACEMENT): ICD-10-CM

## 2019-05-07 DIAGNOSIS — I35.0 CALCIFIC AORTIC VALVE STENOSIS: ICD-10-CM

## 2019-05-07 LAB — BH CV TILT AGENT USED: NORMAL

## 2019-05-07 PROCEDURE — 93880 EXTRACRANIAL BILAT STUDY: CPT | Performed by: SURGERY

## 2019-05-07 PROCEDURE — 93880 EXTRACRANIAL BILAT STUDY: CPT

## 2019-05-07 PROCEDURE — 93660 TILT TABLE EVALUATION: CPT

## 2019-05-07 PROCEDURE — A9270 NON-COVERED ITEM OR SERVICE: HCPCS | Performed by: INTERNAL MEDICINE

## 2019-05-07 PROCEDURE — 63710000001 NITROGLYCERIN 0.4 MG SUBLINGUAL TABLET 25 EACH BOTTLE: Performed by: INTERNAL MEDICINE

## 2019-05-07 PROCEDURE — 93660 TILT TABLE EVALUATION: CPT | Performed by: INTERNAL MEDICINE

## 2019-05-07 RX ORDER — NITROGLYCERIN 0.4 MG/1
0.4 TABLET SUBLINGUAL ONCE
Status: COMPLETED | OUTPATIENT
Start: 2019-05-07 | End: 2019-05-07

## 2019-05-07 RX ADMIN — NITROGLYCERIN 0.4 MG: 0.4 TABLET SUBLINGUAL at 11:39

## 2019-05-09 ENCOUNTER — TELEPHONE (OUTPATIENT)
Dept: OTOLARYNGOLOGY | Facility: CLINIC | Age: 59
End: 2019-05-09

## 2019-05-09 DIAGNOSIS — R94.09 ABNORMAL TILT TABLE TEST: Primary | ICD-10-CM

## 2019-05-09 DIAGNOSIS — R55 SYNCOPE, UNSPECIFIED SYNCOPE TYPE: ICD-10-CM

## 2019-05-13 ENCOUNTER — TELEPHONE (OUTPATIENT)
Dept: OTOLARYNGOLOGY | Facility: CLINIC | Age: 59
End: 2019-05-13

## 2019-05-13 ENCOUNTER — OFFICE VISIT (OUTPATIENT)
Dept: OTOLARYNGOLOGY | Facility: CLINIC | Age: 59
End: 2019-05-13

## 2019-05-13 ENCOUNTER — LAB (OUTPATIENT)
Dept: LAB | Facility: HOSPITAL | Age: 59
End: 2019-05-13

## 2019-05-13 VITALS
WEIGHT: 168 LBS | DIASTOLIC BLOOD PRESSURE: 88 MMHG | HEIGHT: 71 IN | BODY MASS INDEX: 23.52 KG/M2 | TEMPERATURE: 98 F | SYSTOLIC BLOOD PRESSURE: 122 MMHG

## 2019-05-13 DIAGNOSIS — R55 VASODEPRESSOR SYNCOPE: Primary | ICD-10-CM

## 2019-05-13 DIAGNOSIS — E04.1 THYROID NODULE: ICD-10-CM

## 2019-05-13 DIAGNOSIS — I10 ESSENTIAL HYPERTENSION: ICD-10-CM

## 2019-05-13 DIAGNOSIS — Z95.2 S/P AVR (AORTIC VALVE REPLACEMENT): ICD-10-CM

## 2019-05-13 DIAGNOSIS — S02.40DD: ICD-10-CM

## 2019-05-13 DIAGNOSIS — S01.81XD FACIAL LACERATION, SUBSEQUENT ENCOUNTER: ICD-10-CM

## 2019-05-13 LAB — TSH SERPL DL<=0.05 MIU/L-ACNC: 1.06 MIU/ML (ref 0.47–4.68)

## 2019-05-13 PROCEDURE — 36415 COLL VENOUS BLD VENIPUNCTURE: CPT

## 2019-05-13 PROCEDURE — 99214 OFFICE O/P EST MOD 30 MIN: CPT | Performed by: PHYSICIAN ASSISTANT

## 2019-05-13 PROCEDURE — 84443 ASSAY THYROID STIM HORMONE: CPT | Performed by: PHYSICIAN ASSISTANT

## 2019-05-13 NOTE — PROGRESS NOTES
SLIM Patel     Chief Complaint   Patient presents with   • Follow-up     nose       HPI   Jose Juan Ann is a 58 y.o. male who is here for follow up. He has had complaints of a syncopal event at work on 19 that required surgical repair of a complex facial laceration. The syncopal event was sudden and severe. Nothing seemed to make the symptoms worse or better.  The patient has been evaluated with neurology with a normal EEG. He has had a normal chest angiogram to rule-out a PE. The patient has had a normal EKG, normal carotid ultrasound, and relatively normal echocardiogram. The patient recently had a positive tilt table test and is scheduled to see cardiology. The patient has not had another syncopal event since, but has not been cleared to work yet.     Patient Information     Patient Name  Jose Juan Ann MRN  [id number] Sex  Male  (Age)  1960 (58 y.o.)   Interpretation Summary        · Tilt table test was positive when provoked with sublingual nitroglycerin.     POSITIVE FOR NEURALLY MEDIATED SYNCOPE - PREDOMINATELY VASO-DEPRESSOR TYPE      Patient Hx Of Height, Weight, and Vitals     Height Weight BSA (Calculated - sq m) BMI (kg/m2) Pulse BP           Reason For Exam     syncopal event   Dx: Syncope, unspecified syncope type [R55 (ICD-10-CM)]   Tilt Table     Baseline Findings Initial EKG showed normal sinus. The patient exhibited no symptoms prior to the test.   Initial Tilt Findings Patient placed per protocol. EKG showed sinus tachycardia. The patient exhibited no symptoms.   Provoked Tilt Findings Sublingual nitroglycerin was administered. Patient was placed per protocol. Post administration EKG showed sinus tachycardia. Diaphoresis, nausea, eyes blurring prior to syncope The patient exhibited syncope when provoked.   Recovery Findings Recovery EKG showed normal sinus. The patient exhibited no symptoms after the test.   Study Impression Test terminated early due to syncope. Tilt  table test was positive when provoked with sublingual nitroglycerin.     Study Result     History: Carotid occlusive disease     IMPRESSION:  Impression:  1. There is less than 50% stenosis of the right internal carotid artery.  2. There is less than 50% stenosis of the left internal carotid artery.  3. Antegrade flow is demonstrated in bilateral vertebral arteries.     Comments: Bilateral carotid vertebral arterial duplex scan was  performed.     Grayscale imaging shows intimal thickening and calcified elements at the  carotid bifurcation. The right internal carotid artery peak systolic  velocity is 78 cm/sec. The end-diastolic velocity is 35.8 cm/sec. The  right ICA/CCA ratio is approximately 0.77 . These findings correlate  with less than 50% stenosis of the right internal carotid artery.     Grayscale imaging shows intimal thickening and calcified elements at the  carotid bifurcation. The left internal carotid artery peak systolic  velocity is 114 cm/sec. The end-diastolic velocity is 36.9 cm/sec. The  left ICA/CCA ratio is approximately 0.96 . These findings correlate with  less than 50% stenosis of the left internal carotid artery.     Antegrade flow is demonstrated in bilateral vertebral arteries.     This report was finalized on 05/07/2019 16:52 by Dr. Abdelrahman Yi MD.     Interpretation Summary        · Left ventricular systolic function is normal.  · Left ventricular wall thickness is consistent with borderline concentric hypertrophy.  · Left atrial cavity size is borderline dilated.     NORMAL AORTIC BIO-PROSTHETIC VALVE  NORMAL LV AND RV SIZE AND FUNCTION     ECG 12 Lead   Order: [id number]   Status:  Final result   Visible to patient:  Yes (MyChart)   Next appt:  05/13/2019 at 09:15 AM in Otolaryngology (SLIM Patel)      Narrative     Test Reason : pre-op  Blood Pressure : **/** mmHG  Vent. Rate : 062 BPM     Atrial Rate : 062 BPM     P-R Int : 156 ms          QRS Dur : 072 ms      QT  Int : 392 ms       P-R-T Axes : 067 051 084 degrees     QTc Int : 397 ms    Normal sinus rhythm  Normal ECG  When compared with ECG of 18-FEB-2017 21:52,  Vent. rate has decreased BY  38 BPM    Referred By:  CARSON           Confirmed By:AIXA Nieto MD      Specimen Collected: 04/05/19 05:06 Last Resulted: 04/05/19 21:33           Study Result     EXAMINATION: CT ANGIOGRAM CHEST W WO CONTRAST-      4/11/2017 10:44 AM EDT     HISTORY: syncope; R55-Syncope and collapse     In order to have a CT radiation dose as low as reasonably achievable  Automated Exposure Control was utilized for adjustment of the mA and/or  KV according to patient size.     DLP in mGycm= 484.     CT angiography protocol.   CT imaging with bolus IV contrast injection.   Under concurrent supervision axial, sagittal, coronal, and  three-dimensional data sets were constructed.     COMPARISON: 09/24/2015.     Stable appearance of a slightly enlarged and heterogeneous right thyroid  lobe.     Excellent pulmonary artery opacification based on timed bolus contrast  injection.     No pulmonary embolism.     Normal heart size. No mediastinal mass.  No thoracic aortic aneurysm or dissection.     The lungs are fully expanded and clear.  No pneumonia, pneumothorax, or significant pleural effusion.  No congestive heart failure.     Summary:  1. No pulmonary embolism.  2. No acute lung disease.          Review of Systems   Constitutional: Negative for activity change, appetite change, chills, diaphoresis, fatigue, fever and unexpected weight change.   HENT: Negative for congestion, ear discharge, ear pain, facial swelling, hearing loss, mouth sores, nosebleeds, postnasal drip, rhinorrhea, sinus pressure, sneezing, sore throat, tinnitus, trouble swallowing and voice change.    Eyes: Negative for pain, discharge, redness, itching and visual disturbance.   Respiratory: Negative for apnea, cough, choking, chest tightness, shortness of breath, wheezing and  stridor.    Gastrointestinal: Negative for nausea and vomiting.   Endocrine: Negative for cold intolerance and heat intolerance.   Musculoskeletal: Negative for arthralgias, back pain, gait problem, neck pain and neck stiffness.   Skin: Negative for rash.   Allergic/Immunologic: Negative for environmental allergies and food allergies.   Neurological: Positive for syncope. Negative for dizziness, tremors, seizures, facial asymmetry, speech difficulty, weakness, light-headedness, numbness and headaches.   Hematological: Negative for adenopathy. Does not bruise/bleed easily.   Psychiatric/Behavioral: Negative for behavioral problems, sleep disturbance and suicidal ideas. The patient is not nervous/anxious and is not hyperactive.    :    Past History:  Past Medical History:   Diagnosis Date   • Aortic valve stenosis    • Calcific aortic valve stenosis    • History of loop recorder    • Hyperlipidemia    • Hypertension    • Palpitations    • S/P AVR (aortic valve replacement)    • Syncope    • Tobacco use      Past Surgical History:   Procedure Laterality Date   • AORTIC VALVE REPAIR/REPLACEMENT     • ATRIAL APPENDAGE EXCLUSION LEFT WITH TRANSESOPHAGEAL ECHOCARDIOGRAM     • CARDIAC VALVE REPLACEMENT     • CORONARY ARTERY BYPASS GRAFT     • HEAD/NECK LACERATION REPAIR Bilateral 4/5/2019    Procedure: Irrigation debridement and repair of a complex stellate through and through nasal laceration including a 7.3 cm vertical laceration through the nasal mucosa over the nasal dorsum extending to the left nasal ala Repair of 0.5 cm laceration to the left lateral nasal ala Irrigation debridement and repair of 2.1 cm laceration of the right lateral brow\forehead Bilateral diagnostic rigid nasa     Family History   Problem Relation Age of Onset   • Hypertension Mother    • Diabetes Mother    • Heart valve disorder Father    • Hypertension Father    • Heart disease Father      Social History     Tobacco Use   • Smoking status: Former  Smoker   • Smokeless tobacco: Never Used   Substance Use Topics   • Alcohol use: Yes     Alcohol/week: 9.0 oz     Types: 15 Cans of beer per week     Comment: 3-5 daily   • Drug use: No     Outpatient Medications Marked as Taking for the 5/13/19 encounter (Office Visit) with Devan Granda PA   Medication Sig Dispense Refill   • aspirin 81 MG EC tablet Take 81 mg by mouth Daily.     • Multiple Vitamins-Minerals (MULTIVITAMIN ADULT PO) Take  by mouth.     • nicotine polacrilex (NICORELIEF) 2 MG gum Chew 2 mg As Needed for Smoking Cessation.     • Thiamine HCl (VITAMIN B1 PO) Take  by mouth.       Allergies:  Patient has no known allergies.    Vital Signs:   Vitals:    05/13/19 0919   BP: 122/88   Temp: 98 °F (36.7 °C)       Physical Exam   CONSTITUTIONAL: well nourished, alert, oriented, in no acute distress     COMMUNICATION AND VOICE: able to communicate normally, normal voice quality    HEAD: normocephalic, no lesions, atraumatic, no tenderness, no masses     FACE: appearance normal, no lesions, no tenderness, no deformities, facial motion symmetric    SALIVARY GLANDS: parotid glands with no tenderness, no swelling, no masses, submandibular glands with normal size, nontender    EYES: ocular motility normal, eyelids normal, orbits normal, no proptosis, conjunctiva normal , pupils equal, round     EARS:  Hearing: response to conversational voice normal bilaterally   External Ears: auricles without lesions  Otoscopic: tympanic membrane appearance normal, no lesions, no perforation, normal mobility, no fluid    NOSE:  External Nose: structure normal, no tenderness on palpation, no nasal discharge, no lesions, laceration repair healing well, nostrils patent   Intranasal Exam: nasal mucosa normal, vestibule within normal limits, inferior turbinate normal, nasal septum deviated to the right    ORAL:  Lips: upper and lower lips without lesion   Teeth: dentition within normal limits for age   Gums: gingivae healthy    Oral Mucosa: oral mucosa normal, no mucosal lesions   Floor of Mouth: Warthin’s duct patent, mucosa normal  Tongue: lingual mucosa normal without lesions, normal tongue mobility   Palate: soft and hard palates with normal mucosa and structure  Oropharynx: oropharyngeal mucosa normal    NECK: neck appearance normal, no mass,  noted without erythema or tenderness    THYROID: no overt thyromegaly, no tenderness, nodules or mass present on palpation, but noted on previous ultrasound, position midline     LYMPH NODES: no lymphadenopathy    CHEST/RESPIRATORY: respiratory effort normal, normal breath sounds     CARDIOVASCULAR: rate and rhythm normal, extremities without cyanosis or edema      NEUROLOGIC/PSYCHIATRIC: oriented to time, place and person, mood normal, affect appropriate, CN II-XII intact grossly    RESULTS REVIEW:    I have reviewed the patients old records in the chart.  I reviewed the patient's new clinical results.  I reviewed the patient's new imaging results and agree with the interpretation.  I reviewed the patient's other test results and agree with the interpretation    Assessment   Jose Juan was seen today for follow-up.    Diagnoses and all orders for this visit:    Vasodepressor syncope    Open fracture of left side of maxilla with routine healing, subsequent encounter    Facial laceration, subsequent encounter    Essential hypertension    S/P AVR (aortic valve replacement)    Thyroid nodule  -     US Thyroid; Future      * Surgery not found *  Orders Placed This Encounter   Procedures   • US Thyroid     Standing Status:   Future     Standing Expiration Date:   5/13/2020     Order Specific Question:   Reason for Exam:     Answer:   thyroid nodule     Plan      The condition may be controlled by an increased fluid and salt intake and avoidance of triggering factors such as dehydration, extreme heat, alcohol consumption, and prolonged standing. For patients with frequent episodes, orthostatic tilt  training may be useful. Referral to cardiology has been set up.    Return in about 1 year (around 5/13/2020) for Recheck thyroid with ultrasound and TSH.    SLIM Patel  05/13/19  9:40 AM

## 2019-05-13 NOTE — PATIENT INSTRUCTIONS
Condition may be controlled by an increased fluid and salt intake and avoidance of triggering factors such as dehydration, extreme heat, alcohol consumption, and prolonged standing. For patients with frequent episodes, orthostatic tilt training may be useful. Referral to cardiology has been set up, will see if they can get him in sooner.    Will set patient up with thyroid ultrasound and advised to get TSH today. Will follow-up in one year with repeat thyroid evaluation.     If nasal symptoms continue call for sooner follow-up to consider a septoplasty.

## 2019-05-13 NOTE — TELEPHONE ENCOUNTER
----- Message from SLIM Patel sent at 5/13/2019 11:34 AM CDT -----  Please call the patient regarding his normal result.

## 2019-05-21 ENCOUNTER — OFFICE VISIT (OUTPATIENT)
Dept: CARDIOLOGY | Facility: CLINIC | Age: 59
End: 2019-05-21

## 2019-05-21 VITALS
SYSTOLIC BLOOD PRESSURE: 131 MMHG | DIASTOLIC BLOOD PRESSURE: 80 MMHG | WEIGHT: 169 LBS | BODY MASS INDEX: 23.66 KG/M2 | HEART RATE: 85 BPM | HEIGHT: 71 IN

## 2019-05-21 DIAGNOSIS — Z95.2 S/P AVR (AORTIC VALVE REPLACEMENT): ICD-10-CM

## 2019-05-21 DIAGNOSIS — I35.0 CALCIFIC AORTIC VALVE STENOSIS: ICD-10-CM

## 2019-05-21 DIAGNOSIS — I10 ESSENTIAL HYPERTENSION: ICD-10-CM

## 2019-05-21 DIAGNOSIS — R55 NEUROGENIC SYNCOPE: Primary | ICD-10-CM

## 2019-05-21 DIAGNOSIS — E78.5 HYPERLIPIDEMIA, UNSPECIFIED HYPERLIPIDEMIA TYPE: ICD-10-CM

## 2019-05-21 PROCEDURE — 93000 ELECTROCARDIOGRAM COMPLETE: CPT | Performed by: INTERNAL MEDICINE

## 2019-05-21 PROCEDURE — 99213 OFFICE O/P EST LOW 20 MIN: CPT | Performed by: INTERNAL MEDICINE

## 2019-05-21 NOTE — PROGRESS NOTES
Subjective    Jose Juan Ann is a 58 y.o. male. Fu of syncope and vhd    History of Present Illness     SYNCOPE:  Has had 5-6 spells since the original in '15. He has an ILR and syncope was not assoc with an arrhythmia. Recent TTT was + for neurogenic syncope of the vasodepressor type. This reproduced his usual syncope. EKG today is still normal. 5 mins of education by me today re: v-v syncope and counter-measures (aggressive hydration, liberalize salt intake, supine position when the prodrome starts, bearing down and leg muscle contraction, support hose). Have also talked about potential daily meds in the future if conservative measures don't work. May consider referral to an 'autonomic clinic' in the future.    S/P BIO-AVR FOR AS:  Originally thot that this was the cause of his syncope but is now known not to be. He has good stamina and repeat ECHO's show good function of the AVR and the LV.        The following portions of the patient's history were reviewed and updated as appropriate: allergies, current medications, past family history, past medical history, past social history, past surgical history and problem list.    Patient Active Problem List   Diagnosis   • Calcific aortic valve stenosis   • Hyperlipidemia   • Neurogenic syncope   • S/P AVR (aortic valve replacement)   • Essential hypertension   • Facial laceration   • Facial bones, open fracture (CMS/HCC)       No Known Allergies    Family History   Problem Relation Age of Onset   • Hypertension Mother    • Diabetes Mother    • Heart valve disorder Father    • Hypertension Father    • Heart disease Father        Social History     Socioeconomic History   • Marital status:      Spouse name: Not on file   • Number of children: Not on file   • Years of education: Not on file   • Highest education level: Not on file   Tobacco Use   • Smoking status: Former Smoker   • Smokeless tobacco: Never Used   Substance and Sexual Activity   • Alcohol use: Yes     " Alcohol/week: 9.0 oz     Types: 15 Cans of beer per week     Comment: 3-5 daily - not any more    • Drug use: No   • Sexual activity: Defer         Current Outpatient Medications:   •  AMOXICILLIN PO, Take  by mouth As Needed (before any dental work)., Disp: , Rfl:   •  aspirin 81 MG EC tablet, Take 81 mg by mouth Daily., Disp: , Rfl:   •  Multiple Vitamins-Minerals (MULTIVITAMIN ADULT PO), Take  by mouth., Disp: , Rfl:   •  nicotine polacrilex (NICORELIEF) 2 MG gum, Chew 2 mg As Needed for Smoking Cessation., Disp: , Rfl:   •  Thiamine HCl (VITAMIN B1 PO), Take  by mouth., Disp: , Rfl:     Past Surgical History:   Procedure Laterality Date   • AORTIC VALVE REPAIR/REPLACEMENT     • ATRIAL APPENDAGE EXCLUSION LEFT WITH TRANSESOPHAGEAL ECHOCARDIOGRAM     • CARDIAC VALVE REPLACEMENT     • CORONARY ARTERY BYPASS GRAFT     • HEAD/NECK LACERATION REPAIR Bilateral 4/5/2019    Procedure: Irrigation debridement and repair of a complex stellate through and through nasal laceration including a 7.3 cm vertical laceration through the nasal mucosa over the nasal dorsum extending to the left nasal ala Repair of 0.5 cm laceration to the left lateral nasal ala Irrigation debridement and repair of 2.1 cm laceration of the right lateral brow\forehead Bilateral diagnostic rigid nasa       Review of Systems   Constitutional: Negative for fatigue, fever and unexpected weight change.   Respiratory: Negative for apnea, chest tightness and shortness of breath.    Cardiovascular: Negative for chest pain, palpitations and leg swelling.   Gastrointestinal: Negative for abdominal pain.   Genitourinary: Negative for dysuria.   Musculoskeletal: Negative for myalgias.   Neurological: Positive for syncope. Negative for weakness and light-headedness.   Psychiatric/Behavioral: Negative for sleep disturbance.       /80 (BP Location: Right arm, Patient Position: Sitting)   Pulse 85   Ht 180.3 cm (71\")   Wt 76.7 kg (169 lb)   BMI 23.57 kg/m² "   Procedures    Objective   Physical Exam   Constitutional: He is oriented to person, place, and time. He appears well-developed and well-nourished. No distress.   HENT:   Head: Normocephalic.   Eyes: Pupils are equal, round, and reactive to light.   Neck: No thyromegaly present.   Cardiovascular: Normal rate, regular rhythm, normal heart sounds and intact distal pulses. Exam reveals no gallop and no friction rub.   No murmur heard.  Pulmonary/Chest: Effort normal and breath sounds normal. No stridor. No respiratory distress. He has no wheezes. He has no rales.   Abdominal: Soft. Bowel sounds are normal. He exhibits no distension and no mass. There is no tenderness. There is no guarding.   Musculoskeletal: He exhibits no edema or tenderness.   Neurological: He is alert and oriented to person, place, and time.   Skin: Skin is warm and dry. He is not diaphoretic.   Psychiatric: He has a normal mood and affect.       Assessment/Plan   Jose Juan was seen today for loss of consciousness and hypertension.    Diagnoses and all orders for this visit:    Neurogenic syncope  Comments:  long education on this today - no meds for now but conservative measures  Orders:  -     ECG 12 Lead    Calcific aortic valve stenosis    Essential hypertension  Comments:  controlled    Hyperlipidemia, unspecified hyperlipidemia type    S/P AVR (aortic valve replacement)  Comments:  normal to ECHO and ausc                 Return in about 6 months (around 11/21/2019) for Next scheduled follow up with apc.  Orders Placed This Encounter   Procedures   • ECG 12 Lead     Order Specific Question:   Reason for Exam:     Answer:   Syncope/HTN

## 2019-05-23 ENCOUNTER — TELEPHONE (OUTPATIENT)
Dept: CARDIOLOGY | Facility: CLINIC | Age: 59
End: 2019-05-23

## 2019-05-23 NOTE — TELEPHONE ENCOUNTER
Patient has presented himself to the office today needing something in writing to start driving, I did not see anything in the chart regarding this so I was unable to supply patient with this today. Please advise.

## 2019-05-28 NOTE — TELEPHONE ENCOUNTER
Is this patient okay to return to work with no restrictions? Patient is in need of a letter for employer.

## 2023-08-30 NOTE — TELEPHONE ENCOUNTER
Devan Granda, Chelle Feliciano, RN             See who he needs to see for the positive tilt table test. And let patient know this and that his carotid ultrasound was normal      5/9/19 Patient notified. Will refer to cardio   Patient notified of test results. No questions from patient. Understanding results verified and confirm by patient or and PHI contact in chart.  Patient okay with the referral to see the surgeon.

## 2025-07-28 ENCOUNTER — OFFICE VISIT (OUTPATIENT)
Age: 65
End: 2025-07-28

## 2025-07-28 ENCOUNTER — TELEPHONE (OUTPATIENT)
Age: 65
End: 2025-07-28

## 2025-07-28 VITALS
RESPIRATION RATE: 20 BRPM | HEART RATE: 98 BPM | BODY MASS INDEX: 24.42 KG/M2 | DIASTOLIC BLOOD PRESSURE: 102 MMHG | OXYGEN SATURATION: 99 % | HEIGHT: 71 IN | SYSTOLIC BLOOD PRESSURE: 140 MMHG | TEMPERATURE: 97.8 F | WEIGHT: 174.4 LBS

## 2025-07-28 DIAGNOSIS — M89.8X1 PAIN OF RIGHT CLAVICLE: Primary | ICD-10-CM

## 2025-07-28 DIAGNOSIS — R03.0 ELEVATED BLOOD PRESSURE READING: ICD-10-CM

## 2025-07-28 RX ORDER — KETOROLAC TROMETHAMINE 30 MG/ML
60 INJECTION, SOLUTION INTRAMUSCULAR; INTRAVENOUS ONCE
Status: COMPLETED | OUTPATIENT
Start: 2025-07-28 | End: 2025-07-28

## 2025-07-28 RX ADMIN — KETOROLAC TROMETHAMINE 60 MG: 30 INJECTION, SOLUTION INTRAMUSCULAR; INTRAVENOUS at 14:29

## 2025-07-28 NOTE — PATIENT INSTRUCTIONS
Pain of right clavicle:  [place] [facility] [place]   [place]adol 60mg IM injection administered for pain control.  Patient tolerated well without any adverse reactions.  Called ortho. Appointment scheduled with ORLIN Bolivar for 7/30/2025 at 1430 to discuss right shoulder x-ray findings.  Advised patient to continue wearing the sling.  Advised patient to perform deep breathing exercises to prevent atelectasis due to multiple rib fractures.  May take Tylenol as needed for pain.    Elevated Blood Pressure:   - Your blood pressure was elevated during your visit today (greater than or equal to 130/80)  - Monitor your blood pressure at home daily. Keep a log.   - Increase your physical activity   - Follow DASH diet (fruits, vegetables, low-fat dairy, whole grains, poultry/fish)  - Limit alcohol consumption   - Monitor sodium intake  - Take all medication(s) as prescribed.   - Follow up with your PCP regarding elevated blood pressure readings today.   - Go to the ER for any chest pain, shortness of breath, dizziness/lightheadedness, palpitations, or vision changes.

## 2025-07-30 ENCOUNTER — OFFICE VISIT (OUTPATIENT)
Age: 65
End: 2025-07-30
Payer: MEDICARE

## 2025-07-30 VITALS — BODY MASS INDEX: 24.63 KG/M2 | WEIGHT: 175.9 LBS | HEIGHT: 71 IN

## 2025-07-30 DIAGNOSIS — M89.8X1 PAIN OF RIGHT CLAVICLE: Primary | ICD-10-CM

## 2025-07-30 DIAGNOSIS — S42.021A CLOSED DISPLACED FRACTURE OF SHAFT OF RIGHT CLAVICLE, INITIAL ENCOUNTER: ICD-10-CM

## 2025-07-30 PROCEDURE — 99203 OFFICE O/P NEW LOW 30 MIN: CPT | Performed by: PHYSICIAN ASSISTANT

## 2025-07-30 PROCEDURE — 1123F ACP DISCUSS/DSCN MKR DOCD: CPT | Performed by: PHYSICIAN ASSISTANT

## 2025-07-30 RX ORDER — LANOLIN ALCOHOL/MO/W.PET/CERES
1000 CREAM (GRAM) TOPICAL DAILY
COMMUNITY

## 2025-07-30 RX ORDER — OXYCODONE AND ACETAMINOPHEN 7.5; 325 MG/1; MG/1
1 TABLET ORAL EVERY 6 HOURS PRN
Qty: 28 TABLET | Refills: 0 | Status: SHIPPED | OUTPATIENT
Start: 2025-07-30 | End: 2025-08-06

## (undated) DEVICE — MAJOR DOUBLE BASIN W/GOWNS II: Brand: MEDLINE INDUSTRIES, INC.

## (undated) DEVICE — SPONGE,NEURO,0.5"X3",XR,STRL,LF,10/PK: Brand: MEDLINE

## (undated) DEVICE — SOL IRR BSS 15ML STRL

## (undated) DEVICE — DEFOGGER!" ANTI FOG KIT: Brand: DEROYAL

## (undated) DEVICE — TOWEL,OR,DSP,ST,BLUE,STD,4/PK,20PK/CS: Brand: MEDLINE

## (undated) DEVICE — GLV SURG BIOGEL M LTX PF 7 1/2

## (undated) DEVICE — PK TURNOVER RM ADV